# Patient Record
Sex: FEMALE | Race: WHITE | NOT HISPANIC OR LATINO | Employment: OTHER | URBAN - METROPOLITAN AREA
[De-identification: names, ages, dates, MRNs, and addresses within clinical notes are randomized per-mention and may not be internally consistent; named-entity substitution may affect disease eponyms.]

---

## 2023-10-02 ENCOUNTER — LAB REQUISITION (OUTPATIENT)
Dept: LAB | Facility: LAB | Age: 88
End: 2023-10-02
Payer: MEDICARE

## 2023-10-02 ENCOUNTER — LAB (OUTPATIENT)
Dept: LAB | Facility: LAB | Age: 88
End: 2023-10-02
Payer: MEDICARE

## 2023-10-02 DIAGNOSIS — Z01.89 ENCOUNTER FOR OTHER SPECIFIED SPECIAL EXAMINATIONS: ICD-10-CM

## 2023-10-02 LAB
ALBUMIN SERPL-MCNC: 3.8 G/DL (ref 3.5–5)
ALP BLD-CCNC: 75 U/L (ref 35–125)
ALT SERPL-CCNC: 10 U/L (ref 5–40)
ANION GAP SERPL CALC-SCNC: 10 MMOL/L
AST SERPL-CCNC: 16 U/L (ref 5–40)
BASOPHILS # BLD AUTO: 0.03 X10*3/UL (ref 0–0.1)
BASOPHILS NFR BLD AUTO: 0.3 %
BILIRUB DIRECT SERPL-MCNC: <0.2 MG/DL (ref 0–0.2)
BILIRUB SERPL-MCNC: 0.3 MG/DL (ref 0.1–1.2)
BUN SERPL-MCNC: 19 MG/DL (ref 8–25)
CALCIUM SERPL-MCNC: 9.7 MG/DL (ref 8.5–10.4)
CHLORIDE SERPL-SCNC: 102 MMOL/L (ref 97–107)
CO2 SERPL-SCNC: 26 MMOL/L (ref 24–31)
CREAT SERPL-MCNC: 0.9 MG/DL (ref 0.4–1.6)
EOSINOPHIL # BLD AUTO: 0.23 X10*3/UL (ref 0–0.4)
EOSINOPHIL NFR BLD AUTO: 2.5 %
ERYTHROCYTE [DISTWIDTH] IN BLOOD BY AUTOMATED COUNT: 13.2 % (ref 11.5–14.5)
GFR SERPL CREATININE-BSD FRML MDRD: 60 ML/MIN/1.73M*2
GLUCOSE SERPL-MCNC: 95 MG/DL (ref 65–99)
HCT VFR BLD AUTO: 36 % (ref 36–46)
HGB BLD-MCNC: 12 G/DL (ref 12–16)
IMM GRANULOCYTES # BLD AUTO: 0.02 X10*3/UL (ref 0–0.5)
IMM GRANULOCYTES NFR BLD AUTO: 0.2 % (ref 0–0.9)
LYMPHOCYTES # BLD AUTO: 2.91 X10*3/UL (ref 0.8–3)
LYMPHOCYTES NFR BLD AUTO: 32.2 %
MCH RBC QN AUTO: 30.9 PG (ref 26–34)
MCHC RBC AUTO-ENTMCNC: 33.3 G/DL (ref 32–36)
MCV RBC AUTO: 93 FL (ref 80–100)
MONOCYTES # BLD AUTO: 0.69 X10*3/UL (ref 0.05–0.8)
MONOCYTES NFR BLD AUTO: 7.6 %
NEUTROPHILS # BLD AUTO: 5.17 X10*3/UL (ref 1.6–5.5)
NEUTROPHILS NFR BLD AUTO: 57.2 %
NRBC BLD-RTO: 0 /100 WBCS (ref 0–0)
PLATELET # BLD AUTO: 278 X10*3/UL (ref 150–450)
PMV BLD AUTO: 9.9 FL (ref 7.5–11.5)
POTASSIUM SERPL-SCNC: 4.5 MMOL/L (ref 3.4–5.1)
PROT SERPL-MCNC: 6.2 G/DL (ref 5.9–7.9)
RBC # BLD AUTO: 3.88 X10*6/UL (ref 4–5.2)
SODIUM SERPL-SCNC: 138 MMOL/L (ref 133–145)
WBC # BLD AUTO: 9.1 X10*3/UL (ref 4.4–11.3)

## 2023-10-02 PROCEDURE — 36415 COLL VENOUS BLD VENIPUNCTURE: CPT

## 2023-10-02 PROCEDURE — 82248 BILIRUBIN DIRECT: CPT

## 2023-10-02 PROCEDURE — 85025 COMPLETE CBC W/AUTO DIFF WBC: CPT

## 2023-10-02 PROCEDURE — 80053 COMPREHEN METABOLIC PANEL: CPT

## 2023-10-11 ENCOUNTER — LAB (OUTPATIENT)
Dept: LAB | Facility: LAB | Age: 88
End: 2023-10-11
Payer: MEDICARE

## 2023-10-11 ENCOUNTER — OFFICE VISIT (OUTPATIENT)
Dept: PRIMARY CARE | Facility: CLINIC | Age: 88
End: 2023-10-11
Payer: MEDICARE

## 2023-10-11 ENCOUNTER — TELEPHONE (OUTPATIENT)
Dept: PRIMARY CARE | Facility: CLINIC | Age: 88
End: 2023-10-11

## 2023-10-11 VITALS
SYSTOLIC BLOOD PRESSURE: 144 MMHG | HEART RATE: 81 BPM | WEIGHT: 131 LBS | DIASTOLIC BLOOD PRESSURE: 88 MMHG | TEMPERATURE: 95.9 F | OXYGEN SATURATION: 96 %

## 2023-10-11 DIAGNOSIS — R30.0 DYSURIA: ICD-10-CM

## 2023-10-11 DIAGNOSIS — F41.9 ANXIETY: ICD-10-CM

## 2023-10-11 DIAGNOSIS — Z01.89 ENCOUNTER FOR ROUTINE LABORATORY TESTING: ICD-10-CM

## 2023-10-11 DIAGNOSIS — I10 ESSENTIAL HYPERTENSION: Primary | ICD-10-CM

## 2023-10-11 DIAGNOSIS — E55.9 VITAMIN D DEFICIENCY: ICD-10-CM

## 2023-10-11 DIAGNOSIS — N18.2 STAGE 2 CHRONIC KIDNEY DISEASE: ICD-10-CM

## 2023-10-11 DIAGNOSIS — D64.9 NORMOCYTIC ANEMIA: ICD-10-CM

## 2023-10-11 DIAGNOSIS — E78.2 MIXED HYPERLIPIDEMIA: ICD-10-CM

## 2023-10-11 LAB
POC APPEARANCE, URINE: CLEAR
POC BILIRUBIN, URINE: NEGATIVE
POC BLOOD, URINE: ABNORMAL
POC COLOR, URINE: YELLOW
POC GLUCOSE, URINE: NEGATIVE MG/DL
POC KETONES, URINE: NEGATIVE MG/DL
POC LEUKOCYTES, URINE: ABNORMAL
POC NITRITE,URINE: NEGATIVE
POC PH, URINE: 6 PH
POC PROTEIN, URINE: ABNORMAL MG/DL
POC SPECIFIC GRAVITY, URINE: 1.01
POC UROBILINOGEN, URINE: 0.2 EU/DL

## 2023-10-11 PROCEDURE — 1036F TOBACCO NON-USER: CPT | Performed by: STUDENT IN AN ORGANIZED HEALTH CARE EDUCATION/TRAINING PROGRAM

## 2023-10-11 PROCEDURE — 1160F RVW MEDS BY RX/DR IN RCRD: CPT | Performed by: STUDENT IN AN ORGANIZED HEALTH CARE EDUCATION/TRAINING PROGRAM

## 2023-10-11 PROCEDURE — 3079F DIAST BP 80-89 MM HG: CPT | Performed by: STUDENT IN AN ORGANIZED HEALTH CARE EDUCATION/TRAINING PROGRAM

## 2023-10-11 PROCEDURE — 99213 OFFICE O/P EST LOW 20 MIN: CPT | Performed by: STUDENT IN AN ORGANIZED HEALTH CARE EDUCATION/TRAINING PROGRAM

## 2023-10-11 PROCEDURE — 3077F SYST BP >= 140 MM HG: CPT | Performed by: STUDENT IN AN ORGANIZED HEALTH CARE EDUCATION/TRAINING PROGRAM

## 2023-10-11 PROCEDURE — 1126F AMNT PAIN NOTED NONE PRSNT: CPT | Performed by: STUDENT IN AN ORGANIZED HEALTH CARE EDUCATION/TRAINING PROGRAM

## 2023-10-11 PROCEDURE — 1159F MED LIST DOCD IN RCRD: CPT | Performed by: STUDENT IN AN ORGANIZED HEALTH CARE EDUCATION/TRAINING PROGRAM

## 2023-10-11 RX ORDER — FLUTICASONE PROPIONATE 50 MCG
1 SPRAY, SUSPENSION (ML) NASAL DAILY
COMMUNITY
Start: 2023-02-24 | End: 2024-04-15 | Stop reason: ALTCHOICE

## 2023-10-11 RX ORDER — DEXTROMETHORPHAN HYDROBROMIDE, GUAIFENESIN 5; 100 MG/5ML; MG/5ML
650 LIQUID ORAL EVERY 8 HOURS PRN
COMMUNITY

## 2023-10-11 RX ORDER — LISINOPRIL 20 MG/1
20 TABLET ORAL 2 TIMES DAILY
COMMUNITY
Start: 2023-08-20 | End: 2023-10-11 | Stop reason: SDUPTHER

## 2023-10-11 RX ORDER — CALCIUM CARBONATE/VITAMIN D3 500-10/5ML
400 LIQUID (ML) ORAL DAILY
COMMUNITY
End: 2024-01-22 | Stop reason: ALTCHOICE

## 2023-10-11 RX ORDER — POTASSIUM &MAGNESIUM ASPARTATE 250-250 MG
500 CAPSULE ORAL DAILY
COMMUNITY
End: 2024-01-22 | Stop reason: ALTCHOICE

## 2023-10-11 RX ORDER — LORAZEPAM 1 MG/1
1 TABLET ORAL 2 TIMES DAILY PRN
COMMUNITY
Start: 2023-07-21 | End: 2023-10-11 | Stop reason: SDUPTHER

## 2023-10-11 RX ORDER — ASPIRIN 325 MG
1200 TABLET, DELAYED RELEASE (ENTERIC COATED) ORAL DAILY
COMMUNITY
End: 2024-06-05 | Stop reason: DRUGHIGH

## 2023-10-11 RX ORDER — ATORVASTATIN CALCIUM 20 MG/1
20 TABLET, FILM COATED ORAL DAILY
Start: 2023-10-11 | End: 2024-06-05 | Stop reason: ALTCHOICE

## 2023-10-11 RX ORDER — CALCIUM CARBONATE 300MG(750)
10 TABLET,CHEWABLE ORAL DAILY
COMMUNITY
End: 2024-01-22 | Stop reason: ALTCHOICE

## 2023-10-11 RX ORDER — FUROSEMIDE 40 MG/1
40 TABLET ORAL 2 TIMES DAILY
COMMUNITY
Start: 2023-08-23 | End: 2024-06-05 | Stop reason: ALTCHOICE

## 2023-10-11 RX ORDER — FLUCONAZOLE 150 MG/1
150 TABLET ORAL ONCE
Qty: 3 TABLET | Refills: 0 | Status: SHIPPED | OUTPATIENT
Start: 2023-10-11 | End: 2023-10-11

## 2023-10-11 RX ORDER — LORAZEPAM 1 MG/1
1 TABLET ORAL 2 TIMES DAILY PRN
Start: 2023-10-11 | End: 2023-12-01 | Stop reason: SDUPTHER

## 2023-10-11 RX ORDER — ATORVASTATIN CALCIUM 20 MG/1
20 TABLET, FILM COATED ORAL DAILY
COMMUNITY
Start: 2023-01-30 | End: 2023-10-11 | Stop reason: SDUPTHER

## 2023-10-11 RX ORDER — UBIDECARENONE 75 MG
500 CAPSULE ORAL DAILY
COMMUNITY
End: 2024-01-22 | Stop reason: ALTCHOICE

## 2023-10-11 RX ORDER — CEPHALEXIN 500 MG/1
500 CAPSULE ORAL 4 TIMES DAILY
Qty: 20 CAPSULE | Refills: 0 | Status: SHIPPED | OUTPATIENT
Start: 2023-10-11 | End: 2023-10-16

## 2023-10-11 RX ORDER — LISINOPRIL 20 MG/1
20 TABLET ORAL 2 TIMES DAILY
Start: 2023-10-11 | End: 2023-12-01 | Stop reason: SDUPTHER

## 2023-10-11 RX ORDER — ASPIRIN 81 MG/1
81 TABLET ORAL DAILY
COMMUNITY
End: 2024-01-22 | Stop reason: ALTCHOICE

## 2023-10-11 RX ORDER — ACETAMINOPHEN 500 MG
50 TABLET ORAL DAILY
Start: 2023-10-11 | End: 2024-06-05 | Stop reason: ALTCHOICE

## 2023-10-11 RX ORDER — MECLIZINE HYDROCHLORIDE 25 MG/1
25 TABLET ORAL EVERY 8 HOURS PRN
COMMUNITY
Start: 2023-07-21 | End: 2024-02-19 | Stop reason: ALTCHOICE

## 2023-10-11 RX ORDER — ACETAMINOPHEN 500 MG
50 TABLET ORAL DAILY
COMMUNITY
End: 2023-10-11 | Stop reason: SDUPTHER

## 2023-10-11 RX ORDER — FERROUS SULFATE 325(65) MG
65 TABLET ORAL
COMMUNITY
End: 2023-10-11 | Stop reason: SDUPTHER

## 2023-10-11 RX ORDER — FERROUS SULFATE 325(65) MG
65 TABLET ORAL
Start: 2023-10-11 | End: 2024-06-05 | Stop reason: ALTCHOICE

## 2023-10-11 RX ORDER — AMLODIPINE BESYLATE 5 MG/1
5 TABLET ORAL DAILY
COMMUNITY
Start: 2023-03-06 | End: 2023-10-11 | Stop reason: SDUPTHER

## 2023-10-11 RX ORDER — AMLODIPINE BESYLATE 5 MG/1
5 TABLET ORAL DAILY
Start: 2023-10-11 | End: 2024-06-05 | Stop reason: ALTCHOICE

## 2023-10-11 ASSESSMENT — ENCOUNTER SYMPTOMS
HEMATURIA: 1
RESPIRATORY NEGATIVE: 1
CONSTITUTIONAL NEGATIVE: 1
DYSURIA: 1
FREQUENCY: 1
OCCASIONAL FEELINGS OF UNSTEADINESS: 0
GASTROINTESTINAL NEGATIVE: 1
DEPRESSION: 0
LOSS OF SENSATION IN FEET: 0
CARDIOVASCULAR NEGATIVE: 1

## 2023-10-11 ASSESSMENT — PATIENT HEALTH QUESTIONNAIRE - PHQ9
SUM OF ALL RESPONSES TO PHQ9 QUESTIONS 1 AND 2: 0
2. FEELING DOWN, DEPRESSED OR HOPELESS: NOT AT ALL
1. LITTLE INTEREST OR PLEASURE IN DOING THINGS: NOT AT ALL

## 2023-10-11 ASSESSMENT — PAIN SCALES - GENERAL: PAINLEVEL: 0-NO PAIN

## 2023-10-11 NOTE — PROGRESS NOTES
[facility]: [facility]  PROGRESS NOTE      Claudine Jones is a 89 y.o. female that is presenting today for Follow-up.    Assessment/Plan   Diagnoses and all orders for this visit:  Essential hypertension  Comments:  Stable. No changes at this time.  Orders:  -     amLODIPine (Norvasc) 5 mg tablet; Take 1 tablet (5 mg) by mouth once daily.  -     lisinopril 20 mg tablet; Take 1 tablet (20 mg) by mouth 2 times a day.  -     Basic Metabolic Panel; Future  Mixed hyperlipidemia  -     atorvastatin (Lipitor) 20 mg tablet; Take 1 tablet (20 mg) by mouth once daily.  -     Hepatic Function Panel; Future  Stage 2 chronic kidney disease  Comments:  Age-related kidney dysfunction. Not a problem.  Normocytic anemia  Comments:  Looks great. No changes at this time.  Orders:  -     ferrous sulfate 325 (65 Fe) MG tablet; Take 1 tablet (65 mg of iron) by mouth once daily with a meal.  -     CBC and Auto Differential; Future  Vitamin D deficiency  -     cholecalciferol (Vitamin D3) 50 mcg (2,000 unit) capsule; Take 1 capsule (50 mcg) by mouth once daily.  -     Vitamin D 25-Hydroxy,Total (for eval of Vitamin D levels); Future  Anxiety  -     LORazepam (Ativan) 1 mg tablet; Take 1 tablet (1 mg) by mouth 2 times a day as needed for anxiety.  Dysuria  Comments:  Patient noting discharge with concern for yeast infection.  Will order the diflucan now. Check UA and culture before she leaves the office.  Orders:  -     POCT UA (nonautomated) manually resulted  -     fluconazole (Diflucan) 150 mg tablet; Take 1 tablet (150 mg) by mouth 1 time for 1 dose.  -     Urinalysis with Reflex Microscopic and Culture  -     cephalexin (Keflex) 500 mg capsule; Take 1 capsule (500 mg) by mouth 4 times a day for 5 days.  Encounter for routine laboratory testing  Comments:  Labwork today looked great.  Will order labwork for next appointment.  Orders:  -     Basic Metabolic Panel; Future  -     Hepatic Function Panel; Future  -      CBC and Auto Differential; Future  -     Vitamin D 25-Hydroxy,Total (for eval of Vitamin D levels); Future  Other orders  -     Follow Up In Primary Care; Future    Subjective   Difficulty Urinating   This is a new problem. The current episode started in the past 7 days. The problem occurs every urination. The problem has been gradually worsening. The quality of the pain is described as burning (itching). The pain is severe. There has been no fever. She is Not sexually active. There is No history of pyelonephritis. Associated symptoms include a discharge, frequency and hematuria. She has tried nothing for the symptoms. Her past medical history is significant for recurrent UTIs.     Review of Systems   Constitutional: Negative.    Respiratory: Negative.     Cardiovascular: Negative.    Gastrointestinal: Negative.    Genitourinary:  Positive for dysuria, frequency and hematuria.      Objective   Vitals:    10/11/23 0807   BP: 144/88   Pulse: 81   Temp: 35.5 °C (95.9 °F)   SpO2: 96%      There is no height or weight on file to calculate BMI.  Physical Exam  Constitutional:       General: She is not in acute distress.  Neck:      Vascular: No carotid bruit.   Cardiovascular:      Rate and Rhythm: Normal rate and regular rhythm.      Heart sounds: Normal heart sounds.   Pulmonary:      Effort: Pulmonary effort is normal.      Breath sounds: Normal breath sounds.   Musculoskeletal:         General: No swelling.   Neurological:      Mental Status: She is alert. Mental status is at baseline.   Psychiatric:         Mood and Affect: Mood normal.       Diagnostic Results   Lab Results   Component Value Date    GLUCOSE 95 10/02/2023    CALCIUM 9.7 10/02/2023     10/02/2023    K 4.5 10/02/2023    CO2 26 10/02/2023     10/02/2023    BUN 19 10/02/2023    CREATININE 0.90 10/02/2023     Lab Results   Component Value Date    ALT 10 10/02/2023    AST 16 10/02/2023    ALKPHOS 75 10/02/2023    BILITOT 0.3 10/02/2023  "    Lab Results   Component Value Date    WBC 9.1 10/02/2023    HGB 12.0 10/02/2023    HCT 36.0 10/02/2023    MCV 93 10/02/2023     10/02/2023     No results found for: \"CHOL\"  No results found for: \"HDL\"  No results found for: \"LDLCALC\"  No results found for: \"TRIG\"  No components found for: \"CHOLHDL\"  No results found for: \"HGBA1C\"  Other labs not included in the list above were reviewed either before or during this encounter.    History    History reviewed. No pertinent past medical history.  History reviewed. No pertinent surgical history.  No family history on file.  Social History     Socioeconomic History    Marital status: Unknown     Spouse name: Not on file    Number of children: Not on file    Years of education: Not on file    Highest education level: Not on file   Occupational History    Not on file   Tobacco Use    Smoking status: Never    Smokeless tobacco: Never   Substance and Sexual Activity    Alcohol use: Never    Drug use: Never    Sexual activity: Not on file   Other Topics Concern    Not on file   Social History Narrative    Not on file     Social Determinants of Health     Financial Resource Strain: Not on file   Food Insecurity: Not on file   Transportation Needs: Not on file   Physical Activity: Not on file   Stress: Not on file   Social Connections: Not on file   Intimate Partner Violence: Not on file   Housing Stability: Not on file     No Known Allergies  Current Outpatient Medications on File Prior to Visit   Medication Sig Dispense Refill    acetaminophen (Tylenol 8 HOUR) 650 mg ER tablet Take 1 tablet (650 mg) by mouth every 8 hours if needed for mild pain (1 - 3). Do not crush, chew, or split.      aspirin 81 mg EC tablet Take 1 tablet (81 mg) by mouth once daily.      calcium carbonate (CALCIUM 500 ORAL) Take 600 mg by mouth once daily.      cranberry 500 mg capsule Take 500 mg by mouth once daily.      cyanocobalamin (Vitamin B-12) 500 mcg tablet Take 1 tablet (500 mcg) by " mouth once daily.      fluticasone (Flonase) 50 mcg/actuation nasal spray Administer 1 spray into each nostril once daily. shake liquid      furosemide (Lasix) 40 mg tablet Take 1 tablet (40 mg) by mouth 2 times a day.      magnesium oxide 400 mg magnesium capsule Take 1 capsule (400 mg) by mouth once daily.      meclizine (Antivert) 25 mg tablet Take 1 tablet (25 mg) by mouth every 8 hours if needed.      melatonin 10 mg tablet,disintegrating Take 10 mg by mouth once daily.      omega-3 (Fish Oil) 60- mg capsule Take 1,200 mg by mouth once daily.      [DISCONTINUED] amLODIPine (Norvasc) 5 mg tablet Take 1 tablet (5 mg) by mouth once daily.      [DISCONTINUED] atorvastatin (Lipitor) 20 mg tablet Take 1 tablet (20 mg) by mouth once daily.      [DISCONTINUED] cholecalciferol (Vitamin D3) 50 mcg (2,000 unit) capsule Take 1 capsule (50 mcg) by mouth once daily. 2000 mcg      [DISCONTINUED] ferrous sulfate 325 (65 Fe) MG tablet Take 1 tablet (65 mg of iron) by mouth once daily with a meal.      [DISCONTINUED] lisinopril 20 mg tablet Take 1 tablet (20 mg) by mouth 2 times a day.      [DISCONTINUED] LORazepam (Ativan) 1 mg tablet Take 1 tablet (1 mg) by mouth 2 times a day as needed.       No current facility-administered medications on file prior to visit.       There is no immunization history on file for this patient.  Patient's medical history was reviewed and updated either before or during this encounter.    Cash Palencia MD

## 2023-10-11 NOTE — TELEPHONE ENCOUNTER
Millie Salvador called office to clarify Rx for yeast infection. States that she is unsure whether patient is supposed to take now, or after she finishes the atb. She states that pharmacist advised her to call for clarification. Please advise.

## 2023-10-20 ENCOUNTER — TELEPHONE (OUTPATIENT)
Dept: PRIMARY CARE | Facility: CLINIC | Age: 88
End: 2023-10-20
Payer: MEDICARE

## 2023-10-20 DIAGNOSIS — R30.0 DYSURIA: Primary | ICD-10-CM

## 2023-10-20 NOTE — TELEPHONE ENCOUNTER
Patient's friend called office one behalf of patient. Per Millie, patient c/o unresolved urinary burning. States that she felt better initially but burning has returned over last two days. Requesting more atb. Please advise.

## 2023-10-21 RX ORDER — CIPROFLOXACIN 500 MG/1
500 TABLET ORAL 2 TIMES DAILY
Qty: 20 TABLET | Refills: 0 | Status: SHIPPED | OUTPATIENT
Start: 2023-10-21 | End: 2023-10-31

## 2023-12-01 ENCOUNTER — TELEPHONE (OUTPATIENT)
Dept: PRIMARY CARE | Facility: CLINIC | Age: 88
End: 2023-12-01
Payer: MEDICARE

## 2023-12-01 DIAGNOSIS — F41.9 ANXIETY: ICD-10-CM

## 2023-12-01 DIAGNOSIS — I10 ESSENTIAL HYPERTENSION: ICD-10-CM

## 2023-12-01 NOTE — TELEPHONE ENCOUNTER
Patient called to request refills to [facility]:  Lisinopril and lorazepam

## 2023-12-04 DIAGNOSIS — I10 ESSENTIAL HYPERTENSION: ICD-10-CM

## 2023-12-04 RX ORDER — LORAZEPAM 1 MG/1
1 TABLET ORAL 2 TIMES DAILY PRN
Qty: 30 TABLET | Refills: 3 | Status: SHIPPED | OUTPATIENT
Start: 2023-12-04 | End: 2024-06-05 | Stop reason: DRUGHIGH

## 2023-12-04 RX ORDER — LISINOPRIL 20 MG/1
20 TABLET ORAL 2 TIMES DAILY
Qty: 90 TABLET | Refills: 3 | Status: SHIPPED | OUTPATIENT
Start: 2023-12-04 | End: 2023-12-05

## 2023-12-05 RX ORDER — LISINOPRIL 20 MG/1
20 TABLET ORAL 2 TIMES DAILY
Qty: 180 TABLET | Refills: 3 | Status: SHIPPED | OUTPATIENT
Start: 2023-12-05 | End: 2024-06-05 | Stop reason: DRUGHIGH

## 2024-01-17 ENCOUNTER — APPOINTMENT (OUTPATIENT)
Dept: RADIOLOGY | Facility: HOSPITAL | Age: 89
End: 2024-01-17
Payer: MEDICARE

## 2024-01-17 ENCOUNTER — HOSPITAL ENCOUNTER (EMERGENCY)
Facility: HOSPITAL | Age: 89
Discharge: HOME | End: 2024-01-17
Attending: EMERGENCY MEDICINE
Payer: MEDICARE

## 2024-01-17 VITALS
HEIGHT: 62 IN | WEIGHT: 136 LBS | RESPIRATION RATE: 14 BRPM | SYSTOLIC BLOOD PRESSURE: 178 MMHG | OXYGEN SATURATION: 100 % | DIASTOLIC BLOOD PRESSURE: 76 MMHG | HEART RATE: 85 BPM | TEMPERATURE: 97.1 F | BODY MASS INDEX: 25.03 KG/M2

## 2024-01-17 DIAGNOSIS — N39.0 ACUTE UTI: Primary | ICD-10-CM

## 2024-01-17 DIAGNOSIS — S62.91XA CLOSED FRACTURE OF RIGHT HAND, INITIAL ENCOUNTER: ICD-10-CM

## 2024-01-17 DIAGNOSIS — T14.8XXA HEMATOMA: ICD-10-CM

## 2024-01-17 DIAGNOSIS — S09.90XA CLOSED HEAD INJURY, INITIAL ENCOUNTER: ICD-10-CM
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
## 2024-01-17 DIAGNOSIS — S62.501A CLOSED NONDISPLACED FRACTURE OF PHALANX OF RIGHT THUMB, UNSPECIFIED PHALANX, INITIAL ENCOUNTER: ICD-10-CM

## 2024-01-17 LAB
ALBUMIN SERPL-MCNC: 3.6 G/DL (ref 3.5–5)
ALP BLD-CCNC: 69 U/L (ref 35–125)
ALT SERPL-CCNC: 9 U/L (ref 5–40)
ANION GAP SERPL CALC-SCNC: 9 MMOL/L
APPEARANCE UR: CLEAR
AST SERPL-CCNC: 19 U/L (ref 5–40)
BACTERIA #/AREA URNS AUTO: ABNORMAL /HPF
BASOPHILS # BLD AUTO: 0.03 X10*3/UL (ref 0–0.1)
BASOPHILS NFR BLD AUTO: 0.4 %
BILIRUB SERPL-MCNC: <0.2 MG/DL (ref 0.1–1.2)
BILIRUB UR STRIP.AUTO-MCNC: NEGATIVE MG/DL
BUN SERPL-MCNC: 22 MG/DL (ref 8–25)
CALCIUM SERPL-MCNC: 8.7 MG/DL (ref 8.5–10.4)
CHLORIDE SERPL-SCNC: 104 MMOL/L (ref 97–107)
CO2 SERPL-SCNC: 26 MMOL/L (ref 24–31)
COLOR UR: ABNORMAL
CREAT SERPL-MCNC: 0.8 MG/DL (ref 0.4–1.6)
EGFRCR SERPLBLD CKD-EPI 2021: 69 ML/MIN/1.73M*2
EOSINOPHIL # BLD AUTO: 0.24 X10*3/UL (ref 0–0.4)
EOSINOPHIL NFR BLD AUTO: 2.8 %
ERYTHROCYTE [DISTWIDTH] IN BLOOD BY AUTOMATED COUNT: 12.7 % (ref 11.5–14.5)
GLUCOSE SERPL-MCNC: 131 MG/DL (ref 65–99)
GLUCOSE UR STRIP.AUTO-MCNC: NORMAL MG/DL
HCT VFR BLD AUTO: 34.5 % (ref 36–46)
HGB BLD-MCNC: 11.8 G/DL (ref 12–16)
IMM GRANULOCYTES # BLD AUTO: 0.01 X10*3/UL (ref 0–0.5)
IMM GRANULOCYTES NFR BLD AUTO: 0.1 % (ref 0–0.9)
KETONES UR STRIP.AUTO-MCNC: NEGATIVE MG/DL
LEUKOCYTE ESTERASE UR QL STRIP.AUTO: ABNORMAL
LYMPHOCYTES # BLD AUTO: 2.83 X10*3/UL (ref 0.8–3)
LYMPHOCYTES NFR BLD AUTO: 33.5 %
MCH RBC QN AUTO: 31 PG (ref 26–34)
MCHC RBC AUTO-ENTMCNC: 34.2 G/DL (ref 32–36)
MCV RBC AUTO: 91 FL (ref 80–100)
MONOCYTES # BLD AUTO: 0.62 X10*3/UL (ref 0.05–0.8)
MONOCYTES NFR BLD AUTO: 7.3 %
MUCOUS THREADS #/AREA URNS AUTO: ABNORMAL /LPF
NEUTROPHILS # BLD AUTO: 4.73 X10*3/UL (ref 1.6–5.5)
NEUTROPHILS NFR BLD AUTO: 55.9 %
NITRITE UR QL STRIP.AUTO: NEGATIVE
NRBC BLD-RTO: 0 /100 WBCS (ref 0–0)
PH UR STRIP.AUTO: 6.5 [PH]
PLATELET # BLD AUTO: 233 X10*3/UL (ref 150–450)
POTASSIUM SERPL-SCNC: 4.1 MMOL/L (ref 3.4–5.1)
PROT SERPL-MCNC: 6 G/DL (ref 5.9–7.9)
PROT UR STRIP.AUTO-MCNC: NEGATIVE MG/DL
RBC # BLD AUTO: 3.81 X10*6/UL (ref 4–5.2)
RBC # UR STRIP.AUTO: ABNORMAL /UL
RBC #/AREA URNS AUTO: ABNORMAL /HPF
SARS-COV-2 RNA RESP QL NAA+PROBE: NOT DETECTED
SODIUM SERPL-SCNC: 139 MMOL/L (ref 133–145)
SP GR UR STRIP.AUTO: 1.01
UROBILINOGEN UR STRIP.AUTO-MCNC: NORMAL MG/DL
WBC # BLD AUTO: 8.5 X10*3/UL (ref 4.4–11.3)
WBC #/AREA URNS AUTO: ABNORMAL /HPF

## 2024-01-17 PROCEDURE — 81001 URINALYSIS AUTO W/SCOPE: CPT | Performed by: PHYSICIAN ASSISTANT

## 2024-01-17 PROCEDURE — 2500000004 HC RX 250 GENERAL PHARMACY W/ HCPCS (ALT 636 FOR OP/ED): Performed by: PHYSICIAN ASSISTANT

## 2024-01-17 PROCEDURE — 85025 COMPLETE CBC W/AUTO DIFF WBC: CPT | Performed by: PHYSICIAN ASSISTANT

## 2024-01-17 PROCEDURE — 36415 COLL VENOUS BLD VENIPUNCTURE: CPT | Performed by: PHYSICIAN ASSISTANT

## 2024-01-17 PROCEDURE — 80053 COMPREHEN METABOLIC PANEL: CPT | Performed by: PHYSICIAN ASSISTANT

## 2024-01-17 PROCEDURE — 96365 THER/PROPH/DIAG IV INF INIT: CPT

## 2024-01-17 PROCEDURE — 87635 SARS-COV-2 COVID-19 AMP PRB: CPT | Performed by: PHYSICIAN ASSISTANT

## 2024-01-17 PROCEDURE — G0390 TRAUMA RESPONS W/HOSP CRITI: HCPCS

## 2024-01-17 PROCEDURE — 2500000001 HC RX 250 WO HCPCS SELF ADMINISTERED DRUGS (ALT 637 FOR MEDICARE OP): Performed by: PHYSICIAN ASSISTANT

## 2024-01-17 PROCEDURE — 73130 X-RAY EXAM OF HAND: CPT | Mod: RT

## 2024-01-17 PROCEDURE — 99285 EMERGENCY DEPT VISIT HI MDM: CPT | Performed by: EMERGENCY MEDICINE

## 2024-01-17 PROCEDURE — 70450 CT HEAD/BRAIN W/O DYE: CPT

## 2024-01-17 PROCEDURE — 71045 X-RAY EXAM CHEST 1 VIEW: CPT

## 2024-01-17 PROCEDURE — 29125 APPL SHORT ARM SPLINT STATIC: CPT | Mod: RT

## 2024-01-17 PROCEDURE — 87086 URINE CULTURE/COLONY COUNT: CPT | Mod: WESLAB | Performed by: PHYSICIAN ASSISTANT

## 2024-01-17 PROCEDURE — 90715 TDAP VACCINE 7 YRS/> IM: CPT | Performed by: PHYSICIAN ASSISTANT

## 2024-01-17 PROCEDURE — 90471 IMMUNIZATION ADMIN: CPT | Performed by: PHYSICIAN ASSISTANT

## 2024-01-17 PROCEDURE — 72125 CT NECK SPINE W/O DYE: CPT

## 2024-01-17 PROCEDURE — 72170 X-RAY EXAM OF PELVIS: CPT

## 2024-01-17 RX ORDER — HYDROCODONE BITARTRATE AND ACETAMINOPHEN 5; 325 MG/1; MG/1
1 TABLET ORAL EVERY 6 HOURS PRN
Qty: 12 TABLET | Refills: 0 | Status: SHIPPED | OUTPATIENT
Start: 2024-01-17 | End: 2024-01-20

## 2024-01-17 RX ORDER — CEPHALEXIN 500 MG/1
500 CAPSULE ORAL 2 TIMES DAILY
Qty: 14 CAPSULE | Refills: 0 | Status: SHIPPED | OUTPATIENT
Start: 2024-01-17 | End: 2024-01-24

## 2024-01-17 RX ORDER — CEFTRIAXONE 1 G/50ML
1 INJECTION, SOLUTION INTRAVENOUS ONCE
Status: COMPLETED | OUTPATIENT
Start: 2024-01-17 | End: 2024-01-17

## 2024-01-17 RX ORDER — HYDROCODONE BITARTRATE AND ACETAMINOPHEN 5; 325 MG/1; MG/1
1 TABLET ORAL ONCE
Status: COMPLETED | OUTPATIENT
Start: 2024-01-17 | End: 2024-01-17

## 2024-01-17 RX ADMIN — HYDROCODONE BITARTRATE AND ACETAMINOPHEN 1 TABLET: 5; 325 TABLET ORAL at 20:15

## 2024-01-17 RX ADMIN — CEFTRIAXONE SODIUM 1 G: 1 INJECTION, SOLUTION INTRAVENOUS at 19:59

## 2024-01-17 RX ADMIN — TETANUS TOXOID, REDUCED DIPHTHERIA TOXOID AND ACELLULAR PERTUSSIS VACCINE, ADSORBED 0.5 ML: 5; 2.5; 8; 8; 2.5 SUSPENSION INTRAMUSCULAR at 17:01

## 2024-01-17 ASSESSMENT — PAIN - FUNCTIONAL ASSESSMENT: PAIN_FUNCTIONAL_ASSESSMENT: 0-10

## 2024-01-17 ASSESSMENT — LIFESTYLE VARIABLES
EVER FELT BAD OR GUILTY ABOUT YOUR DRINKING: NO
HAVE PEOPLE ANNOYED YOU BY CRITICIZING YOUR DRINKING: NO
EVER HAD A DRINK FIRST THING IN THE MORNING TO STEADY YOUR NERVES TO GET RID OF A HANGOVER: NO
HAVE YOU EVER FELT YOU SHOULD CUT DOWN ON YOUR DRINKING: NO
REASON UNABLE TO ASSESS: NO

## 2024-01-17 ASSESSMENT — PAIN DESCRIPTION - DESCRIPTORS: DESCRIPTORS: ACHING

## 2024-01-17 ASSESSMENT — COLUMBIA-SUICIDE SEVERITY RATING SCALE - C-SSRS
1. IN THE PAST MONTH, HAVE YOU WISHED YOU WERE DEAD OR WISHED YOU COULD GO TO SLEEP AND NOT WAKE UP?: NO
6. HAVE YOU EVER DONE ANYTHING, STARTED TO DO ANYTHING, OR PREPARED TO DO ANYTHING TO END YOUR LIFE?: NO
2. HAVE YOU ACTUALLY HAD ANY THOUGHTS OF KILLING YOURSELF?: NO

## 2024-01-17 ASSESSMENT — PAIN DESCRIPTION - ONSET: ONSET: SUDDEN

## 2024-01-17 ASSESSMENT — PAIN DESCRIPTION - LOCATION: LOCATION: BUTTOCKS

## 2024-01-17 ASSESSMENT — PAIN DESCRIPTION - FREQUENCY: FREQUENCY: CONSTANT/CONTINUOUS

## 2024-01-17 ASSESSMENT — PAIN SCALES - GENERAL
PAINLEVEL_OUTOF10: 3
PAINLEVEL_OUTOF10: 8

## 2024-01-17 ASSESSMENT — PAIN DESCRIPTION - PAIN TYPE: TYPE: ACUTE PAIN

## 2024-01-17 ASSESSMENT — PAIN DESCRIPTION - PROGRESSION: CLINICAL_PROGRESSION: NOT CHANGED

## 2024-01-17 ASSESSMENT — PAIN DESCRIPTION - ORIENTATION: ORIENTATION: LEFT

## 2024-01-17 NOTE — ED PROVIDER NOTES
HPI   Chief Complaint   Patient presents with    Fall     Pt walking up the stairs and loss her balance and fell backwards landing on her bottom. Does not remember if she hit her head. Patient states she is on blood thinners.       HPI  89-year-old female here by EMS for evaluation of a fall, was going up the stairs to her apartment when her hand slipped off the railing and she fell backwards, complaining of pain in her right hand, her gluteal region and says that she did strike her head, says she is on blood thinners but does not know which 1 but also tells me that she takes them for blood pressure.  No loss of consciousness.                  [place] Coma Scale Score: 15         NIH Stroke Scale: 0          Patient History   No past medical history on file.  No past surgical history on file.  No family history on file.  Social History     Tobacco Use    Smoking status: Not on file    Smokeless tobacco: Not on file   Substance Use Topics    Alcohol use: Not on file    Drug use: Not on file       Physical Exam   ED Triage Vitals   Temp Pulse Resp BP   -- -- -- --      SpO2 Temp src Heart Rate Source Patient Position   -- -- -- --      BP Location FiO2 (%)     -- --       Physical Exam  PHYSICAL EXAMINATION    GENERAL APPEARANCE: Awake and alert.     VITAL SIGNS: As per the nurses' triage record.     HEENT: Normocephalic, no evidence of step-off Ken sign or hemotympanums. Extraocular muscles are intact. Pupils equal round and reactive to light. Conjunctiva are pink. Negative scleral icterus. Mucous membranes are moist. Tongue in the midline. Pharynx was without erythema or exudates, uvula midline    NECK: Soft Nontender and supple, full gross ROM, no meningeal signs.    CHEST: Nontender to palpation. Clear to auscultation bilaterally. No rales, rhonchi, or wheezing.     HEART: S1, S2. Regular rate and rhythm. No murmurs, gallops or rubs.  Strong and equal pulses in the extremities.     ABDOMEN: Soft, nontender,  nondistended, positive bowel sounds, no palpable masses.    MUSCULOSKELETAL: Right hand has swelling to the thumb and thenar eminence, no break in the skin.  Some ecchymosis around this region appreciated.  Gluteal hematoma on the left.     NEUROLOGICAL: Awake, alert and oriented x 3. Power intact in the upper and lower extremities. Sensation is intact to light touch in the upper and lower extremities.     IMMUNOLOGICAL: No lymphatic streaking noted     DERM: No petechiae, rashes, or ecchymoses.  ED Course & MDM   ED Course as of 01/17/24 2042 Wed Jan 17, 2024 1645 Just prior to the patient's arrival I spoke to trauma on-call Dr. Choe discussed the case with the known details. [AP]   1935 Discussed findings with trauma physician.  He is on board with discharge and follow-up [AP]   1952 I discussed all findings with the patient.  The patient is adamant about going home, patient has a friend who is identifying as medical power of  who states that she is very concerned about the patient going home due to her unsteadiness.  The patient is fully awake alert oriented and I believe has the medical capacity to make her own medical decisions.  They have indicated they would like to talk about this and consider the options of staying for placement versus going home. [AP]   2008 Patient ambulated very well to the restroom and back with no assistance. [AP]   2035 Discussed all findings, patient wants to go home, I will send the patient home with antibiotics for urinary tract infection and pain medicines for the broken hand.  The patient has been splinted and an extra long thumb spica splint involving the entirety of the thumb to immobilize the thumb and wrist, the patient had a lot of difficulty with dorsiflexion of the wrist secondary to some arthritis, her wrist is flexed palmarly slightly, as it is far more comfortable for her.  Splint instructions discussed [AP]      ED Course User Index  [AP] Demond CARLOS  SHI Johnson         Diagnoses as of 01/17/24 2042   Acute UTI   Closed nondisplaced fracture of phalanx of right thumb, unspecified phalanx, initial encounter   Closed fracture of right hand, initial encounter   Fall, initial encounter   Closed head injury, initial encounter       Medical Decision Making  Patient has been seen in conjunction with attending physician Dr. Prasad    Parts of this chart have been completed using voice recognition software. Please excuse any errors of transcription.  My thought process and reason for plan has been formulated from the time that I saw the patient until the time of disposition and is not specific to one specific moment during their visit and furthermore my MDM encompasses this entire chart and not only this text box.      HPI: Detailed above.    Exam: A medically appropriate exam performed, outlined above, given the known history and presentation.    History obtained from: pt and ems    Social Determinants of Health considered during this visit: Lives at home    Medications given during visit:  Medications   diphth,pertus(acell),tetanus (BoostRIX) 2.5-8-5 Lf-mcg-Lf/0.5mL vaccine 0.5 mL (0.5 mL intramuscular Given 1/17/24 1701)   cefTRIAXone (Rocephin) IVPB 1 g (0 g intravenous Stopped 1/17/24 2015)   HYDROcodone-acetaminophen (Norco) 5-325 mg per tablet 1 tablet (1 tablet oral Given 1/17/24 2015)        Diagnostic/tests  Labs Reviewed   CBC WITH AUTO DIFFERENTIAL - Abnormal       Result Value    WBC 8.5      nRBC 0.0      RBC 3.81 (*)     Hemoglobin 11.8 (*)     Hematocrit 34.5 (*)     MCV 91      MCH 31.0      MCHC 34.2      RDW 12.7      Platelets 233      Neutrophils % 55.9      Immature Granulocytes %, Automated 0.1      Lymphocytes % 33.5      Monocytes % 7.3      Eosinophils % 2.8      Basophils % 0.4      Neutrophils Absolute 4.73      Immature Granulocytes Absolute, Automated 0.01      Lymphocytes Absolute 2.83      Monocytes Absolute 0.62      Eosinophils  Absolute 0.24      Basophils Absolute 0.03     COMPREHENSIVE METABOLIC PANEL - Abnormal    Glucose 131 (*)     Sodium 139      Potassium 4.1      Chloride 104      Bicarbonate 26      Urea Nitrogen 22      Creatinine 0.80      eGFR 69      Calcium 8.7      Albumin 3.6      Alkaline Phosphatase 69      Total Protein 6.0      AST 19      Bilirubin, Total <0.2      ALT 9      Anion Gap 9     URINALYSIS WITH REFLEX CULTURE AND MICROSCOPIC - Abnormal    Color, Urine Light-Yellow      Appearance, Urine Clear      Specific Gravity, Urine 1.010      pH, Urine 6.5      Protein, Urine NEGATIVE      Glucose, Urine Normal      Blood, Urine 0.03 (TRACE) (*)     Ketones, Urine NEGATIVE      Bilirubin, Urine NEGATIVE      Urobilinogen, Urine Normal      Nitrite, Urine NEGATIVE      Leukocyte Esterase, Urine 75 Sultana/µL (*)    MICROSCOPIC ONLY, URINE - Abnormal    WBC, Urine 6-10 (*)     RBC, Urine 3-5      Bacteria, Urine 1+ (*)     Mucus, Urine FEW     SARS-COV-2 PCR, SYMPTOMATIC - Normal    Coronavirus 2019, PCR Not Detected      Narrative:     This assay has received FDA Emergency Use Authorization (EUA) and is only authorized for the duration of time that circumstances exist to justify the authorization of the emergency use of in vitro diagnostic tests for the detection of SARS-CoV-2 virus and/or diagnosis of COVID-19 infection under section 564(b)(1) of the Act, 21 U.S.C. 360bbb-3(b)(1). This assay is an in vitro diagnostic nucleic acid amplification test for the qualitative detection of SARS-CoV-2 from nasopharyngeal specimens and has been validated for use at [facility]. Negative results do not preclude COVID-19 infections and should not be used as the sole basis for diagnosis, treatment, or other management decisions.     URINE CULTURE   URINALYSIS WITH REFLEX CULTURE AND MICROSCOPIC    Narrative:     The following orders were created for panel order Urinalysis with Reflex Culture and  Microscopic.  Procedure                               Abnormality         Status                     ---------                               -----------         ------                     Urinalysis with Reflex C...[[id number]]  Abnormal            Final result               Extra Urine Gray Tube[[id number]]                            In process                   Please view results for these tests on the individual orders.   EXTRA URINE GRAY TUBE      CT head wo IV contrast   Final Result   No acute intracranial abnormality.        MACRO:   None.        Signed by: Chance Jiménez 1/17/2024 5:46 PM   Dictation workstation:   [id number]      CT cervical spine wo IV contrast   Final Result   1. No acute fracture or spondylolisthesis.   2. Degenerative disc disease and spondylosis.             Signed by: Chance Jiménez 1/17/2024 5:47 PM   Dictation workstation:   [id number]      XR chest 1 view   Final Result   No acute cardiopulmonary disease.        Signed by: Sameer Wheeler 1/17/2024 5:18 PM   Dictation workstation:   [id number]      XR hand right 3+ views   Final Result   Acute fracture through the proximal diaphysis of the distal phalanx   of the thumb. There is also a minimally displaced/angulated fracture   through the proximal diaphysis of the 1st metacarpal.        Signed by: Sameer Wheeler 1/17/2024 5:25 PM   Dictation workstation:   [id number]      XR pelvis 1-2 views   Final Result   No evidence for acute fracture or dislocation.             Signed by: Sameer Wheeler 1/17/2024 5:12 PM   Dictation workstation:   [id number]           Considerations/further MDM:  Patient feels comfortable going home, not interested at all in staying in the hospital despite the offer, I considered head injury, intracranial pathology, intracranial hemorrhage, spinal cord involvement or spinal injury such as cervical fractures.  Also considered hand injuries and hand fractures and other orthopedic injuries.   Considered intra-abdominal intrathoracic pathology.  Patient is up and ambulatory has isolated pain at this time to the right hand, has been splinted in accordance to the injury.  The patient has been provided follow-up to hand/orthopedics, says that she follows closely with her family doctor who she does not usually have any trouble getting in for expedited follow-up.  Discussed splint care and management as well as return precautions and follow-up instructions.  I did offer the patient hospitalization however she got up walked around the department went to the bathroom on her own and stated that she does not feel that she needs to be in the hospital and also now the family who identifies as power of  is agreeable to this plan as well.  They have discussed an understanding of return precautions and follow-up instructions.  I will send them home with prescriptions for Keflex for urinary tract infection as well as Norco for the hand fracture      Procedure  Procedures     Demond Johnson PA-C  01/17/24 1707

## 2024-01-17 NOTE — PROGRESS NOTES
Attestation/Supervisory note for ROVERTO Johnson      The patient is a 89-year-old female presenting to the emergency department by EMS from home after she reportedly fell down 4 steps.  She states that she just missed a step and it caused her to fall down 4 steps.  She states she injured her left arm, right thumb head and neck.  She states that most of the pain is in the right thumb.  She does use blood thinners but she is not sure the name of it.  She believes that it is Coumadin when I mention names.  She denies any weakness or numbness.  She denies any visual changes.  She states that she does have some pain in the back of her head.  She states it is painful when she moves her neck.  She denies any chest pain or shortness of breath.  No abdominal pain.  No nausea or vomiting.  No diarrhea or constipation but no urinary complaints.  No fever or chills.  No cough or congestion.  All pertinent positives and negatives are recorded above.  All other systems reviewed and otherwise negative.  Vital signs with hypertension but otherwise within normal limits.  Physical exam with a well-nourished well-developed female in no acute distress.  HEENT exam with a cephalohematoma to the back of her head but otherwise unremarkable.  She has no evidence of airway compromise or respiratory distress.  Abdominal exam is benign.  She has no gross motor, neurologic or vascular deficits on exam.  NIH stroke scale score of 0.  She does not any visible or palpable bony deformities but does have a large hematoma to the right thumb.      Limited trauma activation was initiated by the EMS quarterback.  Dr Muller did respond to the trauma activation      Diagnostic labs with mild anemia and evidence of a possible urinary tract infection but otherwise unremarkable.      IV Rocephin was ordered.      CT head wo IV contrast   Final Result   No acute intracranial abnormality.        MACRO:   None.        Signed by: Chance Jiménez 1/17/2024 5:46  PM   Dictation workstation:   [id number]      CT cervical spine wo IV contrast   Final Result   1. No acute fracture or spondylolisthesis.   2. Degenerative disc disease and spondylosis.             Signed by: Chance Jiménez 1/17/2024 5:47 PM   Dictation workstation:   [id number]      XR chest 1 view   Final Result   No acute cardiopulmonary disease.        Signed by: Sameer Wheeler 1/17/2024 5:18 PM   Dictation workstation:   [id number]      XR hand right 3+ views   Final Result   Acute fracture through the proximal diaphysis of the distal phalanx   of the thumb. There is also a minimally displaced/angulated fracture   through the proximal diaphysis of the 1st metacarpal.        Signed by: Sameer Wheeler 1/17/2024 5:25 PM   Dictation workstation:   [id number]      XR pelvis 1-2 views   Final Result   No evidence for acute fracture or dislocation.             Signed by: Sameer Wheeler 1/17/2024 5:12 PM   Dictation workstation:   [id number]           Stick labs with evidence of a possible urinary tract infection but otherwise unremarkable.  The patient does not have any evidence of intracranial hemorrhage or skull fracture on CT head.  No evidence of fracture or dislocation on CT C-spine.  Chest x-ray without acute process.  No evidence of pneumonia or pneumothorax.  No evidence of rib fracture.  The pelvis x-ray does not show any evidence of fracture or dislocation.  The patient does have a fracture of the proximal diaphysis of the distal phalanx of the thumb on the right hand x-ray.  The patient also has a minimally displaced angulated fracture of the proximal diaphysis of the first metacarpal.  She is able to walk and stand without difficulty.  She is able to converse without difficulty.        A splint was applied by nursing staff.  The patient was neurovascular intact after splint application.      The patient was released in good condition in the company of a family member.  She will follow-up  with her primary care physician within 1 to 2 days for further management of her current symptoms and repeat check of her blood pressure.  She was also given a referral to orthopedics for further management of her hand injury.  She will return to the emergency department sooner with worsening of symptoms or onset of new symptoms.  Rx given for Norco for pain control.        Impression/diagnosis  Fall on the steps, initial encounter  Closed head injury  Right first metacarpal fracture and right proximal phalanx fracture of the thumb.  Hypertension, unspecified  Acute lower urinary tract infection      Critical care time of  23 minutes billed for management of trauma activation with consultation with the trauma surgeon, consultation with the patient regarding results, monitoring the patient in the emergency department and evaluation of the patient for any critical injuries.  This time excludes time for billable procedures.      critical care time billed for by me is non concurrent with time billed for by ROVERTO Johnson    I personally saw the patient and made/approve the management plan and take responsibility for the patient management.      I independently interpreted the following study (S): Diagnostic labs      I personally discussed the patient's management with the patient and the trauma surgeon      I reviewed the results of the diagnostic labs and diagnostic imaging.  Formal radiology read was completed by the radiologist.      Sandra Prasad MD

## 2024-01-18 LAB — HOLD SPECIMEN: NORMAL

## 2024-01-18 NOTE — DISCHARGE INSTRUCTIONS
You have been splinted here in the Emergency Department.      You should NOT take this splint off until seen by a specialist for further evaluation as discussed.  Allow for the specialist to remove the splint.  Keep splint clean and dry.  If you wish to shower, use a garbage bag or other such method to ensure the splint stays dry.        Be sure to return to the ER without delay if you feel like your splint is too tight, your fingers/toes are turning colors or you begin to loose sensation or motor function of the affected area.      If you have been provided with a sling (based on the specific injury) wear the sling as often as possible and ensure prompt follow as directed.  *(if you have not been provided with a sling please disregard)      Be sure to follow up as directed in 1-2 days.  All of the details of your follow up instructions are detailed in the follow up section of this packet.     If you are being discharged with any pains medications or muscle relaxers (norco, Vicodin, hydrocodone products, Percocet, oxycodone products, flexeril, cyclobenzaprine, robaxin, norflex, brand or generic, or any other pain controlling medications with the exception of Ibuprofen and regular Tylenol, do not drive or operate machinery, climb ladders or participate in any activity that could potentially put yourself or others at risk should you get dizzy, or be/feel impaired at all.        It is important to remember that your care does not end here and you must continue to monitor your condition closely. Please return to the emergency department for any worsening or concerning signs or symptoms as directed by our conversations and the discharge instructions. Otherwise please follow up with your doctor in 2 days if no better or worse. If you do not have a doctor please contact the referral number on your discharge instructions. Please contact any physician specialists provided in your discharge notes as it is very important to  follow up with them regarding your condition. If you are unable to reach the physicians provided, please come back to the Emergency Department at any time.        Return to emergency room without delay for ANY new or worsening pains or for any other symptoms or concerns.

## 2024-01-19 LAB — BACTERIA UR CULT: NO GROWTH

## 2024-01-19 RX ORDER — MECLIZINE HYDROCHLORIDE 25 MG/1
TABLET ORAL EVERY 8 HOURS PRN
COMMUNITY
Start: 2018-01-02 | End: 2024-01-22 | Stop reason: ALTCHOICE

## 2024-01-19 RX ORDER — TIZANIDINE 2 MG/1
TABLET ORAL EVERY 12 HOURS
COMMUNITY
Start: 2023-02-24 | End: 2024-01-22 | Stop reason: ALTCHOICE

## 2024-01-19 RX ORDER — FUROSEMIDE 40 MG/1
TABLET ORAL EVERY 12 HOURS
COMMUNITY
Start: 2021-11-08 | End: 2024-01-22 | Stop reason: ALTCHOICE

## 2024-01-19 RX ORDER — CALCIUM CARBONATE 600 MG
600 TABLET ORAL
COMMUNITY
End: 2024-04-15 | Stop reason: ALTCHOICE

## 2024-01-19 RX ORDER — LORATADINE 10 MG/1
TABLET ORAL EVERY 24 HOURS
COMMUNITY
Start: 2023-02-24 | End: 2024-01-22 | Stop reason: ALTCHOICE

## 2024-01-19 RX ORDER — CALCIUM CARBONATE 300MG(750)
10 TABLET,CHEWABLE ORAL
COMMUNITY

## 2024-01-19 RX ORDER — NAPROXEN SODIUM 220 MG
TABLET ORAL EVERY 12 HOURS
COMMUNITY
Start: 2023-02-24 | End: 2024-01-22 | Stop reason: ALTCHOICE

## 2024-01-19 RX ORDER — UBIDECARENONE 75 MG
CAPSULE ORAL
COMMUNITY
End: 2024-02-19 | Stop reason: ALTCHOICE

## 2024-01-19 RX ORDER — POTASSIUM &MAGNESIUM ASPARTATE 250-250 MG
CAPSULE ORAL
COMMUNITY
End: 2024-06-05 | Stop reason: ALTCHOICE

## 2024-01-19 RX ORDER — FAMOTIDINE 20 MG/1
TABLET, FILM COATED ORAL EVERY 24 HOURS
COMMUNITY
End: 2024-01-22 | Stop reason: ALTCHOICE

## 2024-01-19 RX ORDER — CALCIUM CARBONATE/VITAMIN D3 500-10/5ML
LIQUID (ML) ORAL
COMMUNITY
End: 2024-06-05 | Stop reason: ALTCHOICE

## 2024-01-19 RX ORDER — LIDOCAINE 560 MG/1
PATCH PERCUTANEOUS; TOPICAL; TRANSDERMAL
COMMUNITY
End: 2024-01-22 | Stop reason: ALTCHOICE

## 2024-01-19 RX ORDER — DEXTROMETHORPHAN HYDROBROMIDE, GUAIFENESIN 5; 100 MG/5ML; MG/5ML
LIQUID ORAL EVERY 6 HOURS PRN
COMMUNITY
Start: 2023-02-24 | End: 2024-01-22 | Stop reason: ALTCHOICE

## 2024-01-19 RX ORDER — FLUTICASONE PROPIONATE 50 MCG
SPRAY, SUSPENSION (ML) NASAL
COMMUNITY
Start: 2023-02-24 | End: 2024-01-22 | Stop reason: ALTCHOICE

## 2024-01-19 RX ORDER — ASPIRIN 81 MG/1
TABLET ORAL
COMMUNITY
End: 2024-06-05 | Stop reason: DRUGHIGH

## 2024-01-22 ENCOUNTER — OFFICE VISIT (OUTPATIENT)
Dept: ORTHOPEDIC SURGERY | Facility: CLINIC | Age: 89
End: 2024-01-22
Payer: MEDICARE

## 2024-01-22 VITALS — WEIGHT: 139.8 LBS | HEIGHT: 62 IN | BODY MASS INDEX: 25.73 KG/M2

## 2024-01-22 DIAGNOSIS — S62.514A NONDISPLACED FRACTURE OF PROXIMAL PHALANX OF RIGHT THUMB, INITIAL ENCOUNTER FOR CLOSED FRACTURE: ICD-10-CM

## 2024-01-22 DIAGNOSIS — S62.339S: ICD-10-CM

## 2024-01-22 DIAGNOSIS — M79.641 RIGHT HAND PAIN: Primary | ICD-10-CM

## 2024-01-22 PROCEDURE — 1036F TOBACCO NON-USER: CPT | Performed by: ORTHOPAEDIC SURGERY

## 2024-01-22 PROCEDURE — 1126F AMNT PAIN NOTED NONE PRSNT: CPT | Performed by: ORTHOPAEDIC SURGERY

## 2024-01-22 PROCEDURE — 1159F MED LIST DOCD IN RCRD: CPT | Performed by: ORTHOPAEDIC SURGERY

## 2024-01-22 PROCEDURE — 1160F RVW MEDS BY RX/DR IN RCRD: CPT | Performed by: ORTHOPAEDIC SURGERY

## 2024-01-22 PROCEDURE — 26600 TREAT METACARPAL FRACTURE: CPT | Performed by: ORTHOPAEDIC SURGERY

## 2024-01-22 ASSESSMENT — PAIN SCALES - GENERAL: PAINLEVEL: 0-NO PAIN

## 2024-01-22 NOTE — PROGRESS NOTES
Subjective      Chief Complaint   Patient presents with    Right Hand - New Patient Visit     Pt was seen at hospitals on 1/17/24 for a fall.  Has two broken bones in her right hand.          No surgery found     HPI  This 89-year-old young woman fell on 1- and injured her right hand.  She hit her head but did not lose consciousness.  She was seen at [facility] emergency department where she was evaluated with x-rays and referred.  She comes in today with her friend and power of  and states that this right hand pain is very bothersome.    CARDIOLOGY:   Negative for chest pain, shortness of breath.   RESPIRATORY:   Negative for chest pain, shortness of breath.   MUSCULOSKELETAL:   See HPI for details.   NEUROLOGY:   Negative for tingling, numbness, weakness.    HEENT: The patient fell and did hit her head but did not lose consciousness.  Objective    There were no vitals filed for this visit.    Physical Exam  GENERAL:          General Appearance:  This is a pleasant patient with appropriate affect, in no acute distress.   DERMATOLOGY:          Skin: skin at the neck, upper and lower back, and trunk is intact. There is no evidence of skin rash, skin breakdown or ulceration, or atrophic skin change.   EXTREMITIES:          Vascular:  Right, left hands and feet are warm with good color and pulses. Right and left calf and thigh are nontender and nonswollen.   NEUROLOGICAL:          Orientation:  Patient is alert and oriented to person, place, time and situation. Right and left upper and lower extremity motor and sensory examinations are intact.      HEENT: No otorrhea or rhinorrhea.      MUSCULOSKELETAL: Neck: Nontender. No pain with range of motion. Right hand: The splint is removed.  There is tenderness at  the right first metacarpal and also of the phalanx of the right thumb.  Position is overall clinically satisfactory.  X-rays and imaging studies listed below are reviewed with the patient in the  office and with her friend and POA in the office today.    CT cervical spine wo IV contrast    Result Date: 1/17/2024  Interpreted By:  Chance Jiménez, STUDY: CT CERVICAL SPINE WO IV CONTRAST; 1/17/2024 5:21 pm   INDICATION: Signs/Symptoms:trama;   COMPARISON: None   ACCESSION NUMBER(S): [id number]   ORDERING CLINICIAN: GOKUL GARCIA   TECHNIQUE: Contiguous axial images were acquired from the skull base to the lung apices. All CT examinations are performed with 1 or more of the following dose reduction techniques: Automated exposure control, adjustment of mA and/or kv according to patient's size, or use of iterative reconstruction techniques.   FINDINGS: There is straightening of the normal cervical lordosis.  No acute fracture or spondylolisthesis is identified.     The occipital condyles, arch of C1, and the odontoid processes are intact. The atlantoaxial relationship is well maintained.   Moderate multilevel degenerative changes with osteophytic spurring and loss of disc height worsened of mid to lower cervical spine.   The visualized lung apices are unremarkable.       1. No acute fracture or spondylolisthesis. 2. Degenerative disc disease and spondylosis.     Signed by: Chance Jiménez 1/17/2024 5:47 PM Dictation workstation:   [id number]    CT head wo IV contrast    Result Date: 1/17/2024  Interpreted By:  Chance Jiménez, STUDY: CT HEAD WO IV CONTRAST;  1/17/2024 5:21 pm   INDICATION: Signs/Symptoms:trauma.   COMPARISON: None..   ACCESSION NUMBER(S): [id number]   ORDERING CLINICIAN: GOKUL GARCIA   TECHNIQUE: CT axial images through the Brain were obtained without contrast. All CT examinations are performed with 1 or more of the following dose reduction techniques: Automated exposure control, adjustment of mA and/or kv according to patient's size, or use of iterative reconstruction techniques.   FINDINGS: There is no mass effect, hemorrhage, or infarct. Gray-white differentiation is  maintained. There is diffuse prominence of the ventricles and sulci indicating the presence of mild diffuse cerebral volume loss. The basal cisterns are patent. Patchy areas of hypodense attenuation are seen in the subcortical and periventricular white matter; compatible with small vessel ischemic disease. The visualized paranasal sinuses appear clear.       No acute intracranial abnormality.   MACRO: None.   Signed by: Chance Jiménez 1/17/2024 5:46 PM Dictation workstation:   [id number]    XR hand right 3+ views    Result Date: 1/17/2024  Interpreted By:  Sameer Wheeler, STUDY: XR HAND RIGHT 3+ VIEWS; 1/17/2024 4:55 pm   INDICATION: Signs/Symptoms:trauma;   COMPARISON: None available.   ACCESSION NUMBER(S): [id number]   ORDERING CLINICIAN: GOKUL GARCIA   TECHNIQUE: Views:  AP, lateral, and oblique views of the right hand   FINDINGS: Acute fracture through the proximal diaphysis of the distal phalanx of the thumb. There is also a minimally displaced/angulated fracture through the proximal diaphysis of the 1st metacarpal.   Degenerative changes of the metacarpophalangeal joints, wrist joints, carpal bones, and interphalangeal joints with moderate to moderate-severe generalized osteoarthritis.       Acute fracture through the proximal diaphysis of the distal phalanx of the thumb. There is also a minimally displaced/angulated fracture through the proximal diaphysis of the 1st metacarpal.   Signed by: Sameer Wheeler 1/17/2024 5:25 PM Dictation workstation:   [id number]    XR chest 1 view    Result Date: 1/17/2024  Interpreted By:  Sameer Wheeler, STUDY: XR CHEST 1 VIEW; 1/17/2024 4:55 pm   INDICATION: Signs/Symptoms:trauma.   COMPARISON: None   ACCESSION NUMBER(S): [id number]   ORDERING CLINICIAN: GOKUL GARCIA   TECHNIQUE: 1 view of the chest was performed.   FINDINGS: The lungs are adequately inflated. No acute consolidation. No pleural effusion. No pneumothorax.  The cardiomediastinal  silhouette is within normal limits.       No acute cardiopulmonary disease.   Signed by: Sameer Wheeler 1/17/2024 5:18 PM Dictation workstation:   [id number]    XR pelvis 1-2 views    Result Date: 1/17/2024  Interpreted By:  Sameer Wheeler, STUDY: XR PELVIS 1-2 VIEWS; 1/17/2024 4:55 pm   INDICATION: Signs/Symptoms:trauma; Pain   COMPARISON: None available.   ACCESSION NUMBER(S): [id number]   ORDERING CLINICIAN: GOKUL GARCIA   TECHNIQUE: Views: AP view of the pelvis.   FINDINGS: RESULTS: There is no evidence for acute fracture or dislocation. There is a nonspecific bowel gas pattern.   Pelvic bones appear intact. Hip joints show mild degenerative changes without acute osseous abnormality. Proximal femurs are intact. Degenerative changes of the lumbar spine.       No evidence for acute fracture or dislocation.     Signed by: Sameer Wheeler 1/17/2024 5:12 PM Dictation workstation:   [id number]       Claudine was seen today for new patient visit.  Diagnoses and all orders for this visit:  Right hand pain (Primary)  Nondisplaced fracture of proximal phalanx of right thumb, initial encounter for closed fracture  Fracture of metacarpal neck of right hand, closed, sequela     Options are discussed with the patient   And her friend and POA in the office today.  An initial attempt at nonoperative treatment with application of a splint to the right thumb  with its risks, benefits, alternatives (operative treatment which the patient refuses and I agree) and the fact that no guarantee could be made and that some cosmetic deformity will likely result were all discussed with the patient and her POA in the office today.  The patient refuses operative treatment (I agree with her) and wishes to proceed with  an initial attempt at nonoperative treatment with splinting. patient's friend and POA and I both agree.  A splint is applied to the right thumb in the office today.The patient is instructed regarding activity  modification and risk for further injury with falling or trauma, ice, physician directed at home gentle strengthening and ROM exercises, and the appropriate use of Tylenol as needed for pain with its potential adverse reactions and side effects. The patient understands.   Return in 4 weeks for splint removal and kerri-ray or sooner as needed.Please note that this report has been produced using speech recognition software.  It may contain errors related to grammar, punctuation or spelling.  Electronically signed, but not reviewed.    Tomas Carlisle MD

## 2024-02-12 ENCOUNTER — TELEPHONE (OUTPATIENT)
Dept: PRIMARY CARE | Facility: CLINIC | Age: 89
End: 2024-02-12
Payer: MEDICARE

## 2024-02-12 ENCOUNTER — LAB (OUTPATIENT)
Dept: LAB | Facility: LAB | Age: 89
End: 2024-02-12
Payer: MEDICARE

## 2024-02-12 DIAGNOSIS — R30.0 DYSURIA: ICD-10-CM

## 2024-02-12 DIAGNOSIS — R30.0 DYSURIA: Primary | ICD-10-CM

## 2024-02-12 LAB
APPEARANCE UR: CLEAR
BILIRUB UR STRIP.AUTO-MCNC: NEGATIVE MG/DL
COLOR UR: YELLOW
GLUCOSE UR STRIP.AUTO-MCNC: NORMAL MG/DL
KETONES UR STRIP.AUTO-MCNC: NEGATIVE MG/DL
LEUKOCYTE ESTERASE UR QL STRIP.AUTO: ABNORMAL
MUCOUS THREADS #/AREA URNS AUTO: ABNORMAL /LPF
NITRITE UR QL STRIP.AUTO: NEGATIVE
PH UR STRIP.AUTO: 5.5 [PH]
PROT UR STRIP.AUTO-MCNC: ABNORMAL MG/DL
RBC # UR STRIP.AUTO: ABNORMAL /UL
RBC #/AREA URNS AUTO: ABNORMAL /HPF
SP GR UR STRIP.AUTO: 1.02
SQUAMOUS #/AREA URNS AUTO: ABNORMAL /HPF
TRANS CELLS #/AREA UR COMP ASSIST: ABNORMAL /HPF
UROBILINOGEN UR STRIP.AUTO-MCNC: NORMAL MG/DL
WBC #/AREA URNS AUTO: ABNORMAL /HPF

## 2024-02-12 PROCEDURE — 87086 URINE CULTURE/COLONY COUNT: CPT

## 2024-02-12 PROCEDURE — 81001 URINALYSIS AUTO W/SCOPE: CPT

## 2024-02-12 NOTE — TELEPHONE ENCOUNTER
Patient's POA called office stating that patient urinary itching, urinary frequency and urinary pain since last week. Millie requesting Rx for atb.

## 2024-02-12 NOTE — TELEPHONE ENCOUNTER
Patient needs to provide a urine sample. Order written for her to do it at the lab. Unable to provide an antibiotic without this being done. Thanks.

## 2024-02-13 LAB — BACTERIA UR CULT: NORMAL

## 2024-02-14 PROBLEM — S16.1XXA STRAIN OF NECK MUSCLE: Status: ACTIVE | Noted: 2024-02-14

## 2024-02-14 PROBLEM — I67.9 CEREBROVASCULAR DISEASE: Status: ACTIVE | Noted: 2024-02-14

## 2024-02-14 PROBLEM — B99.9 INFECTION: Status: ACTIVE | Noted: 2024-02-14

## 2024-02-14 PROBLEM — K21.9 GASTROESOPHAGEAL REFLUX DISEASE WITHOUT ESOPHAGITIS: Status: ACTIVE | Noted: 2024-02-14

## 2024-02-14 PROBLEM — E86.0 DEHYDRATION: Status: ACTIVE | Noted: 2024-02-14

## 2024-02-14 PROBLEM — R42 DIZZINESS AND GIDDINESS: Status: ACTIVE | Noted: 2024-02-14

## 2024-02-14 PROBLEM — E87.6 HYPOKALEMIA: Status: ACTIVE | Noted: 2024-02-14

## 2024-02-14 PROBLEM — R73.03 PREDIABETES: Status: ACTIVE | Noted: 2024-02-14

## 2024-02-14 PROBLEM — R40.1 CLOUDED CONSCIOUSNESS: Status: ACTIVE | Noted: 2024-02-14

## 2024-02-14 PROBLEM — R11.2 NAUSEA & VOMITING: Status: ACTIVE | Noted: 2024-02-14

## 2024-02-14 PROBLEM — I63.9 ISCHEMIC CEREBROVASCULAR ACCIDENT (CVA) (MULTI): Status: ACTIVE | Noted: 2024-02-14
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
## 2024-02-14 PROBLEM — R53.1 WEAKNESS: Status: ACTIVE | Noted: 2024-02-14

## 2024-02-14 PROBLEM — R50.9 FEVER: Status: ACTIVE | Noted: 2024-02-14

## 2024-02-14 PROBLEM — R60.0 EDEMA OF LOWER EXTREMITY: Status: ACTIVE | Noted: 2024-02-14

## 2024-02-14 PROBLEM — S09.90XA INJURY OF HEAD: Status: ACTIVE | Noted: 2024-02-14

## 2024-02-14 PROBLEM — E86.1 HYPOVOLEMIA: Status: ACTIVE | Noted: 2024-02-14

## 2024-02-14 PROBLEM — M15.0 PRIMARY OSTEOARTHRITIS INVOLVING MULTIPLE JOINTS: Status: ACTIVE | Noted: 2024-02-14

## 2024-02-14 PROBLEM — R30.0 DYSURIA: Status: ACTIVE | Noted: 2023-10-11

## 2024-02-14 PROBLEM — N18.9 CHRONIC RENAL FAILURE SYNDROME: Status: ACTIVE | Noted: 2023-10-11

## 2024-02-14 PROBLEM — E83.52 HYPERCALCEMIA: Status: ACTIVE | Noted: 2023-04-10

## 2024-02-14 PROBLEM — M15.9 PRIMARY OSTEOARTHRITIS INVOLVING MULTIPLE JOINTS: Status: ACTIVE | Noted: 2024-02-14

## 2024-02-14 PROBLEM — N39.0 ACUTE LOWER URINARY TRACT INFECTION: Status: ACTIVE | Noted: 2024-02-14

## 2024-02-14 PROBLEM — J30.2 SEASONAL ALLERGIES: Status: ACTIVE | Noted: 2024-02-14

## 2024-02-14 PROBLEM — E87.1 HYPONATREMIA: Status: ACTIVE | Noted: 2024-02-14

## 2024-02-14 PROBLEM — K57.92 DIVERTICULITIS: Status: ACTIVE | Noted: 2024-02-14

## 2024-02-14 PROBLEM — M25.519 PAIN IN UNSPECIFIED SHOULDER: Status: ACTIVE | Noted: 2023-02-24

## 2024-02-14 PROBLEM — K57.32 DIVERTICULITIS OF COLON: Status: ACTIVE | Noted: 2024-02-14

## 2024-02-14 PROBLEM — E78.5 DYSLIPIDEMIA: Status: ACTIVE | Noted: 2024-02-14

## 2024-02-14 RX ORDER — AMOXICILLIN 500 MG/1
CAPSULE ORAL
COMMUNITY
End: 2024-04-15 | Stop reason: ALTCHOICE

## 2024-02-14 RX ORDER — CEPHALEXIN 500 MG/1
500 CAPSULE ORAL 4 TIMES DAILY
Qty: 20 CAPSULE | Refills: 0 | Status: CANCELLED | OUTPATIENT
Start: 2024-02-14 | End: 2024-02-19

## 2024-02-14 RX ORDER — FLUCONAZOLE 150 MG/1
TABLET ORAL
COMMUNITY
Start: 2023-10-11 | End: 2024-06-05 | Stop reason: ALTCHOICE

## 2024-02-14 RX ORDER — LANOLIN ALCOHOL/MO/W.PET/CERES
CREAM (GRAM) TOPICAL EVERY 24 HOURS
COMMUNITY
End: 2024-06-05 | Stop reason: DRUGHIGH

## 2024-02-15 ENCOUNTER — TELEPHONE (OUTPATIENT)
Dept: PRIMARY CARE | Facility: CLINIC | Age: 89
End: 2024-02-15
Payer: MEDICARE

## 2024-02-15 DIAGNOSIS — B37.9 YEAST INFECTION: Primary | ICD-10-CM

## 2024-02-15 RX ORDER — FLUCONAZOLE 150 MG/1
150 TABLET ORAL ONCE
Qty: 1 TABLET | Refills: 0 | Status: SHIPPED | OUTPATIENT
Start: 2024-02-15 | End: 2024-02-15

## 2024-02-15 NOTE — TELEPHONE ENCOUNTER
Millie requesting UA results and also states that she believes pt may have a yeast infection; c/o vaginal itching. Requesting Rx for that as well. Please advise.

## 2024-02-15 NOTE — TELEPHONE ENCOUNTER
Patient's urine culture showed no growth. The antibiotics probably gave her a yeast infection. Will send diflucan.

## 2024-02-19 ENCOUNTER — HOSPITAL ENCOUNTER (OUTPATIENT)
Dept: RADIOLOGY | Facility: CLINIC | Age: 89
Discharge: HOME | End: 2024-02-19
Payer: MEDICARE

## 2024-02-19 ENCOUNTER — OFFICE VISIT (OUTPATIENT)
Dept: ORTHOPEDIC SURGERY | Facility: CLINIC | Age: 89
End: 2024-02-19
Payer: MEDICARE

## 2024-02-19 VITALS — WEIGHT: 139.77 LBS | BODY MASS INDEX: 25.56 KG/M2

## 2024-02-19 DIAGNOSIS — S62.339S: ICD-10-CM

## 2024-02-19 DIAGNOSIS — S62.514A NONDISPLACED FRACTURE OF PROXIMAL PHALANX OF RIGHT THUMB, INITIAL ENCOUNTER FOR CLOSED FRACTURE: ICD-10-CM

## 2024-02-19 DIAGNOSIS — S62.509A THUMB FRACTURE: ICD-10-CM

## 2024-02-19 DIAGNOSIS — M79.641 RIGHT HAND PAIN: Primary | ICD-10-CM

## 2024-02-19 PROCEDURE — 1159F MED LIST DOCD IN RCRD: CPT | Performed by: ORTHOPAEDIC SURGERY

## 2024-02-19 PROCEDURE — 1036F TOBACCO NON-USER: CPT | Performed by: ORTHOPAEDIC SURGERY

## 2024-02-19 PROCEDURE — 1126F AMNT PAIN NOTED NONE PRSNT: CPT | Performed by: ORTHOPAEDIC SURGERY

## 2024-02-19 PROCEDURE — 99024 POSTOP FOLLOW-UP VISIT: CPT | Performed by: ORTHOPAEDIC SURGERY

## 2024-02-19 PROCEDURE — 1160F RVW MEDS BY RX/DR IN RCRD: CPT | Performed by: ORTHOPAEDIC SURGERY

## 2024-02-19 PROCEDURE — 1157F ADVNC CARE PLAN IN RCRD: CPT | Performed by: ORTHOPAEDIC SURGERY

## 2024-02-19 PROCEDURE — 73140 X-RAY EXAM OF FINGER(S): CPT | Mod: RT

## 2024-02-19 ASSESSMENT — PATIENT HEALTH QUESTIONNAIRE - PHQ9
2. FEELING DOWN, DEPRESSED OR HOPELESS: NOT AT ALL
1. LITTLE INTEREST OR PLEASURE IN DOING THINGS: NOT AT ALL
SUM OF ALL RESPONSES TO PHQ9 QUESTIONS 1 AND 2: 0

## 2024-02-19 ASSESSMENT — PAIN SCALES - GENERAL: PAINLEVEL_OUTOF10: 2

## 2024-02-19 ASSESSMENT — ENCOUNTER SYMPTOMS
DEPRESSION: 0
OCCASIONAL FEELINGS OF UNSTEADINESS: 0
LOSS OF SENSATION IN FEET: 0

## 2024-02-19 ASSESSMENT — PAIN DESCRIPTION - DESCRIPTORS: DESCRIPTORS: ACHING

## 2024-02-19 ASSESSMENT — LIFESTYLE VARIABLES: TOTAL SCORE: 0

## 2024-02-19 ASSESSMENT — PAIN - FUNCTIONAL ASSESSMENT: PAIN_FUNCTIONAL_ASSESSMENT: 0-10

## 2024-02-19 NOTE — PROGRESS NOTES
Subjective      Chief Complaint   Patient presents with    Right Hand - Follow-up     Right thumb        No surgery found     HPI  This 89-year-old young woman fell on 1- and injured her right hand.  She hit her head but did not lose consciousness.  She was seen at [facility] emergency department where she was evaluated with x-rays and referred.  She comes in today with her friend and power of  and states that this right hand pain improved in the splint. She presents today for re-xray.    CARDIOLOGY:   Negative for chest pain, shortness of breath.   RESPIRATORY:   Negative for chest pain, shortness of breath.   MUSCULOSKELETAL:   See HPI for details.   NEUROLOGY:   Negative for tingling, numbness, weakness.    HEENT: The patient fell and did hit her head but did not lose consciousness.  Objective    There were no vitals filed for this visit.    Physical Exam  GENERAL:          General Appearance:  This is a pleasant patient with appropriate affect, in no acute distress.   DERMATOLOGY:          Skin: skin at the neck, upper and lower back, and trunk is intact. There is no evidence of skin rash, skin breakdown or ulceration, or atrophic skin change.   EXTREMITIES:          Vascular:  Right, left hands and feet are warm with good color and pulses. Right and left calf and thigh are nontender and nonswollen.   NEUROLOGICAL:          Orientation:  Patient is alert and oriented to person, place, time and situation. Right and left upper and lower extremity motor and sensory examinations are intact.      HEENT: No otorrhea or rhinorrhea.      MUSCULOSKELETAL: Neck: Nontender. No pain with range of motion. Right hand: The splint is removed.  There is no tenderness at  the right first metacarpal.  Position is overall clinically satisfactory.  X-rays of the right hand done and read in the office today after splint removal show that the previously noted fracture of the right first metacarpal is unchanged in  position and alignment.  Early callus appears to be present and fracture line appears to be less visible than on previous x-rays consistent with healing.  Fracture line is still somewhat visible but the patient has no tenderness.  I reviewed these x-rays as well as x-rays and imaging studies listed below are reviewed with the patient in the office and with her friend and POA in the office today.    CT cervical spine wo IV contrast    Result Date: 1/17/2024  Interpreted By:  Chance Jiménez, STUDY: CT CERVICAL SPINE WO IV CONTRAST; 1/17/2024 5:21 pm   INDICATION: Signs/Symptoms:trama;   COMPARISON: None   ACCESSION NUMBER(S): [id number]   ORDERING CLINICIAN: GOKUL GARCIA   TECHNIQUE: Contiguous axial images were acquired from the skull base to the lung apices. All CT examinations are performed with 1 or more of the following dose reduction techniques: Automated exposure control, adjustment of mA and/or kv according to patient's size, or use of iterative reconstruction techniques.   FINDINGS: There is straightening of the normal cervical lordosis.  No acute fracture or spondylolisthesis is identified.     The occipital condyles, arch of C1, and the odontoid processes are intact. The atlantoaxial relationship is well maintained.   Moderate multilevel degenerative changes with osteophytic spurring and loss of disc height worsened of mid to lower cervical spine.   The visualized lung apices are unremarkable.       1. No acute fracture or spondylolisthesis. 2. Degenerative disc disease and spondylosis.     Signed by: Chance Jiménez 1/17/2024 5:47 PM Dictation workstation:   [id number]    CT head wo IV contrast    Result Date: 1/17/2024  Interpreted By:  Chance Jiménez, STUDY: CT HEAD WO IV CONTRAST;  1/17/2024 5:21 pm   INDICATION: Signs/Symptoms:trauma.   COMPARISON: None..   ACCESSION NUMBER(S): [id number]   ORDERING CLINICIAN: GOKUL GARCIA   TECHNIQUE: CT axial images through the Brain were  obtained without contrast. All CT examinations are performed with 1 or more of the following dose reduction techniques: Automated exposure control, adjustment of mA and/or kv according to patient's size, or use of iterative reconstruction techniques.   FINDINGS: There is no mass effect, hemorrhage, or infarct. Gray-white differentiation is maintained. There is diffuse prominence of the ventricles and sulci indicating the presence of mild diffuse cerebral volume loss. The basal cisterns are patent. Patchy areas of hypodense attenuation are seen in the subcortical and periventricular white matter; compatible with small vessel ischemic disease. The visualized paranasal sinuses appear clear.       No acute intracranial abnormality.   MACRO: None.   Signed by: Chance Jiménez 1/17/2024 5:46 PM Dictation workstation:   [id number]    XR hand right 3+ views    Result Date: 1/17/2024  Interpreted By:  Sameer Wheeler, STUDY: XR HAND RIGHT 3+ VIEWS; 1/17/2024 4:55 pm   INDICATION: Signs/Symptoms:trauma;   COMPARISON: None available.   ACCESSION NUMBER(S): [id number]   ORDERING CLINICIAN: GOKUL GARCIA   TECHNIQUE: Views:  AP, lateral, and oblique views of the right hand   FINDINGS: Acute fracture through the proximal diaphysis of the distal phalanx of the thumb. There is also a minimally displaced/angulated fracture through the proximal diaphysis of the 1st metacarpal.   Degenerative changes of the metacarpophalangeal joints, wrist joints, carpal bones, and interphalangeal joints with moderate to moderate-severe generalized osteoarthritis.       Acute fracture through the proximal diaphysis of the distal phalanx of the thumb. There is also a minimally displaced/angulated fracture through the proximal diaphysis of the 1st metacarpal.   Signed by: Sameer Wheeler 1/17/2024 5:25 PM Dictation workstation:   [id number]    XR chest 1 view    Result Date: 1/17/2024  Interpreted By:  Sameer Wheeler, STUDY: XR CHEST 1  VIEW; 1/17/2024 4:55 pm   INDICATION: Signs/Symptoms:trauma.   COMPARISON: None   ACCESSION NUMBER(S): [id number]   ORDERING CLINICIAN: GOKUL GARCIA   TECHNIQUE: 1 view of the chest was performed.   FINDINGS: The lungs are adequately inflated. No acute consolidation. No pleural effusion. No pneumothorax.  The cardiomediastinal silhouette is within normal limits.       No acute cardiopulmonary disease.   Signed by: Sameer Wheeler 1/17/2024 5:18 PM Dictation workstation:   [id number]    XR pelvis 1-2 views    Result Date: 1/17/2024  Interpreted By:  Sameer Wheeler, STUDY: XR PELVIS 1-2 VIEWS; 1/17/2024 4:55 pm   INDICATION: Signs/Symptoms:trauma; Pain   COMPARISON: None available.   ACCESSION NUMBER(S): [id number]   ORDERING CLINICIAN: GOKUL GARCIA   TECHNIQUE: Views: AP view of the pelvis.   FINDINGS: RESULTS: There is no evidence for acute fracture or dislocation. There is a nonspecific bowel gas pattern.   Pelvic bones appear intact. Hip joints show mild degenerative changes without acute osseous abnormality. Proximal femurs are intact. Degenerative changes of the lumbar spine.       No evidence for acute fracture or dislocation.     Signed by: Sameer Wheeler 1/17/2024 5:12 PM Dictation workstation:   [id number]       Claudine was seen today for follow-up.  Diagnoses and all orders for this visit:  Right hand pain (Primary)  Nondisplaced fracture of proximal phalanx of right thumb, initial encounter for closed fracture  Fracture of metacarpal neck of right hand, closed, sequela     Options are discussed with the patient  and her friend and POA in the office today. The patient is instructed regarding activity modification and risk for further injury with falling or trauma, use of an over-the-counter wrist/hand splint as needed for pain and support, ice, physician directed at home gentle strengthening and ROM exercises, and the appropriate use of Tylenol as needed for pain with its  potential adverse reactions and side effects. The patient understands.   Return as needed.Please note that this report has been produced using speech recognition software.  It may contain errors related to grammar, punctuation or spelling.  Electronically signed, but not reviewed.    Tomas Carlisle MD
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
## 2024-03-25 PROBLEM — S09.90XA CLOSED HEAD INJURY: Status: ACTIVE | Noted: 2024-03-25

## 2024-03-25 PROBLEM — T14.8XXA HEMATOMA: Status: ACTIVE | Noted: 2024-03-25

## 2024-03-25 RX ORDER — LIDOCAINE 560 MG/1
PATCH PERCUTANEOUS; TOPICAL; TRANSDERMAL
COMMUNITY
End: 2024-04-15 | Stop reason: ALTCHOICE

## 2024-03-25 RX ORDER — FAMOTIDINE 20 MG/1
TABLET, FILM COATED ORAL EVERY 24 HOURS
COMMUNITY
End: 2024-06-05 | Stop reason: DRUGHIGH

## 2024-03-25 RX ORDER — LORATADINE 10 MG/1
TABLET ORAL EVERY 24 HOURS
COMMUNITY
Start: 2023-02-24 | End: 2024-06-05 | Stop reason: DRUGHIGH

## 2024-03-25 RX ORDER — NAPROXEN SODIUM 220 MG
TABLET ORAL EVERY 12 HOURS
COMMUNITY
Start: 2023-02-24 | End: 2024-04-15 | Stop reason: ALTCHOICE

## 2024-04-06 ENCOUNTER — LAB (OUTPATIENT)
Dept: LAB | Facility: LAB | Age: 89
End: 2024-04-06
Payer: MEDICARE

## 2024-04-06 DIAGNOSIS — I10 ESSENTIAL HYPERTENSION: ICD-10-CM

## 2024-04-06 DIAGNOSIS — E78.2 MIXED HYPERLIPIDEMIA: ICD-10-CM

## 2024-04-06 DIAGNOSIS — E55.9 VITAMIN D DEFICIENCY: ICD-10-CM

## 2024-04-06 DIAGNOSIS — D64.9 NORMOCYTIC ANEMIA: ICD-10-CM

## 2024-04-06 DIAGNOSIS — Z01.89 ENCOUNTER FOR ROUTINE LABORATORY TESTING: ICD-10-CM

## 2024-04-06 LAB
25(OH)D3 SERPL-MCNC: 53 NG/ML (ref 31–100)
ALBUMIN SERPL-MCNC: 4 G/DL (ref 3.5–5)
ALP BLD-CCNC: 75 U/L (ref 35–125)
ALT SERPL-CCNC: 12 U/L (ref 5–40)
ANION GAP SERPL CALC-SCNC: 13 MMOL/L
AST SERPL-CCNC: 18 U/L (ref 5–40)
BASOPHILS # BLD AUTO: 0.02 X10*3/UL (ref 0–0.1)
BASOPHILS NFR BLD AUTO: 0.3 %
BILIRUB DIRECT SERPL-MCNC: <0.2 MG/DL (ref 0–0.2)
BILIRUB SERPL-MCNC: 0.2 MG/DL (ref 0.1–1.2)
BUN SERPL-MCNC: 21 MG/DL (ref 8–25)
CALCIUM SERPL-MCNC: 9.7 MG/DL (ref 8.5–10.4)
CHLORIDE SERPL-SCNC: 100 MMOL/L (ref 97–107)
CO2 SERPL-SCNC: 27 MMOL/L (ref 24–31)
CREAT SERPL-MCNC: 0.8 MG/DL (ref 0.4–1.6)
EGFRCR SERPLBLD CKD-EPI 2021: 69 ML/MIN/1.73M*2
EOSINOPHIL # BLD AUTO: 0.18 X10*3/UL (ref 0–0.4)
EOSINOPHIL NFR BLD AUTO: 2.5 %
ERYTHROCYTE [DISTWIDTH] IN BLOOD BY AUTOMATED COUNT: 12 % (ref 11.5–14.5)
GLUCOSE SERPL-MCNC: 108 MG/DL (ref 65–99)
HCT VFR BLD AUTO: 40.1 % (ref 36–46)
HGB BLD-MCNC: 13.2 G/DL (ref 12–16)
IMM GRANULOCYTES # BLD AUTO: 0.01 X10*3/UL (ref 0–0.5)
IMM GRANULOCYTES NFR BLD AUTO: 0.1 % (ref 0–0.9)
LYMPHOCYTES # BLD AUTO: 3.7 X10*3/UL (ref 0.8–3)
LYMPHOCYTES NFR BLD AUTO: 52 %
MCH RBC QN AUTO: 30 PG (ref 26–34)
MCHC RBC AUTO-ENTMCNC: 32.9 G/DL (ref 32–36)
MCV RBC AUTO: 91 FL (ref 80–100)
MONOCYTES # BLD AUTO: 0.46 X10*3/UL (ref 0.05–0.8)
MONOCYTES NFR BLD AUTO: 6.5 %
NEUTROPHILS # BLD AUTO: 2.75 X10*3/UL (ref 1.6–5.5)
NEUTROPHILS NFR BLD AUTO: 38.6 %
NRBC BLD-RTO: 0 /100 WBCS (ref 0–0)
PLATELET # BLD AUTO: 274 X10*3/UL (ref 150–450)
POTASSIUM SERPL-SCNC: 5 MMOL/L (ref 3.4–5.1)
PROT SERPL-MCNC: 6.6 G/DL (ref 5.9–7.9)
RBC # BLD AUTO: 4.4 X10*6/UL (ref 4–5.2)
SODIUM SERPL-SCNC: 140 MMOL/L (ref 133–145)
WBC # BLD AUTO: 7.1 X10*3/UL (ref 4.4–11.3)

## 2024-04-06 PROCEDURE — 36415 COLL VENOUS BLD VENIPUNCTURE: CPT

## 2024-04-06 PROCEDURE — 80053 COMPREHEN METABOLIC PANEL: CPT

## 2024-04-06 PROCEDURE — 85025 COMPLETE CBC W/AUTO DIFF WBC: CPT

## 2024-04-06 PROCEDURE — 82306 VITAMIN D 25 HYDROXY: CPT

## 2024-04-06 PROCEDURE — 82248 BILIRUBIN DIRECT: CPT

## 2024-04-08 ENCOUNTER — APPOINTMENT (OUTPATIENT)
Dept: PRIMARY CARE | Facility: CLINIC | Age: 89
End: 2024-04-08
Payer: MEDICARE

## 2024-04-15 ENCOUNTER — OFFICE VISIT (OUTPATIENT)
Dept: PRIMARY CARE | Facility: CLINIC | Age: 89
End: 2024-04-15
Payer: MEDICARE

## 2024-04-15 ENCOUNTER — LAB (OUTPATIENT)
Dept: LAB | Facility: LAB | Age: 89
End: 2024-04-15
Payer: MEDICARE

## 2024-04-15 VITALS
OXYGEN SATURATION: 98 % | DIASTOLIC BLOOD PRESSURE: 60 MMHG | WEIGHT: 133 LBS | HEART RATE: 65 BPM | HEIGHT: 62 IN | SYSTOLIC BLOOD PRESSURE: 138 MMHG | BODY MASS INDEX: 24.48 KG/M2 | TEMPERATURE: 97.3 F

## 2024-04-15 DIAGNOSIS — Z01.89 ENCOUNTER FOR ROUTINE LABORATORY TESTING: ICD-10-CM

## 2024-04-15 DIAGNOSIS — H81.13 BENIGN PAROXYSMAL POSITIONAL VERTIGO DUE TO BILATERAL VESTIBULAR DISORDER: ICD-10-CM

## 2024-04-15 DIAGNOSIS — Z71.89 COUNSELING REGARDING ADVANCED CARE PLANNING AND GOALS OF CARE: ICD-10-CM

## 2024-04-15 DIAGNOSIS — Z79.899 POLYPHARMACY: ICD-10-CM

## 2024-04-15 DIAGNOSIS — Z00.00 ANNUAL PHYSICAL EXAM: Primary | ICD-10-CM

## 2024-04-15 DIAGNOSIS — I10 PRIMARY HYPERTENSION: ICD-10-CM

## 2024-04-15 DIAGNOSIS — E55.9 VITAMIN D DEFICIENCY: ICD-10-CM

## 2024-04-15 DIAGNOSIS — M15.9 PRIMARY OSTEOARTHRITIS INVOLVING MULTIPLE JOINTS: ICD-10-CM

## 2024-04-15 DIAGNOSIS — I67.9 CEREBROVASCULAR DISEASE: ICD-10-CM

## 2024-04-15 DIAGNOSIS — K21.9 GASTROESOPHAGEAL REFLUX DISEASE WITHOUT ESOPHAGITIS: ICD-10-CM

## 2024-04-15 DIAGNOSIS — E78.2 MIXED HYPERLIPIDEMIA: ICD-10-CM

## 2024-04-15 DIAGNOSIS — D64.9 ANEMIA, UNSPECIFIED TYPE: ICD-10-CM

## 2024-04-15 DIAGNOSIS — Z23 ENCOUNTER FOR IMMUNIZATION: ICD-10-CM

## 2024-04-15 DIAGNOSIS — F41.9 ANXIETY: ICD-10-CM

## 2024-04-15 DIAGNOSIS — R73.03 PREDIABETES: ICD-10-CM

## 2024-04-15 PROBLEM — T14.8XXA HEMATOMA: Status: RESOLVED | Noted: 2024-03-25 | Resolved: 2024-04-15

## 2024-04-15 PROBLEM — E78.5 HLD (HYPERLIPIDEMIA): Status: ACTIVE | Noted: 2023-10-11

## 2024-04-15 PROBLEM — R40.1 CLOUDED CONSCIOUSNESS: Status: RESOLVED | Noted: 2024-02-14 | Resolved: 2024-04-15

## 2024-04-15 PROBLEM — R53.1 WEAKNESS: Status: RESOLVED | Noted: 2024-02-14 | Resolved: 2024-04-15

## 2024-04-15 PROBLEM — S62.339S: Status: RESOLVED | Noted: 2024-01-22 | Resolved: 2024-04-15

## 2024-04-15 PROBLEM — E86.0 DEHYDRATION: Status: RESOLVED | Noted: 2024-02-14 | Resolved: 2024-04-15

## 2024-04-15 PROBLEM — R11.2 NAUSEA & VOMITING: Status: RESOLVED | Noted: 2024-02-14 | Resolved: 2024-04-15

## 2024-04-15 PROBLEM — H81.10 BPPV (BENIGN PAROXYSMAL POSITIONAL VERTIGO): Status: ACTIVE | Noted: 2024-04-15

## 2024-04-15 PROBLEM — R50.9 FEVER: Status: RESOLVED | Noted: 2024-02-14 | Resolved: 2024-04-15

## 2024-04-15 PROBLEM — E86.1 HYPOVOLEMIA: Status: RESOLVED | Noted: 2024-02-14 | Resolved: 2024-04-15

## 2024-04-15 PROBLEM — Z87.19 HISTORY OF DIVERTICULOSIS: Status: ACTIVE | Noted: 2024-04-15

## 2024-04-15 PROBLEM — K57.92 DIVERTICULITIS: Status: RESOLVED | Noted: 2024-02-14 | Resolved: 2024-04-15

## 2024-04-15 PROBLEM — M25.519 PAIN IN UNSPECIFIED SHOULDER: Status: RESOLVED | Noted: 2023-02-24 | Resolved: 2024-04-15

## 2024-04-15 PROBLEM — S09.90XA CLOSED HEAD INJURY: Status: RESOLVED | Noted: 2024-03-25 | Resolved: 2024-04-15

## 2024-04-15 PROBLEM — S09.90XA INJURY OF HEAD: Status: RESOLVED | Noted: 2024-02-14 | Resolved: 2024-04-15
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
## 2024-04-15 PROBLEM — M79.641 RIGHT HAND PAIN: Status: RESOLVED | Noted: 2024-01-22 | Resolved: 2024-04-15

## 2024-04-15 PROBLEM — B99.9 INFECTION: Status: RESOLVED | Noted: 2024-02-14 | Resolved: 2024-04-15

## 2024-04-15 PROBLEM — Z86.73 HISTORY OF STROKE: Status: ACTIVE | Noted: 2024-04-15

## 2024-04-15 PROBLEM — E87.6 HYPOKALEMIA: Status: RESOLVED | Noted: 2024-02-14 | Resolved: 2024-04-15

## 2024-04-15 PROBLEM — M19.90 OA (OSTEOARTHRITIS): Status: ACTIVE | Noted: 2024-02-14

## 2024-04-15 PROBLEM — I63.9 ISCHEMIC CEREBROVASCULAR ACCIDENT (CVA) (MULTI): Status: RESOLVED | Noted: 2024-02-14 | Resolved: 2024-04-15

## 2024-04-15 PROBLEM — R42 DIZZINESS AND GIDDINESS: Status: RESOLVED | Noted: 2024-02-14 | Resolved: 2024-04-15

## 2024-04-15 PROBLEM — S16.1XXA STRAIN OF NECK MUSCLE: Status: RESOLVED | Noted: 2024-02-14 | Resolved: 2024-04-15

## 2024-04-15 PROBLEM — N39.0 ACUTE LOWER URINARY TRACT INFECTION: Status: RESOLVED | Noted: 2024-02-14 | Resolved: 2024-04-15

## 2024-04-15 PROBLEM — S62.514A NONDISPLACED FRACTURE OF PROXIMAL PHALANX OF RIGHT THUMB, INITIAL ENCOUNTER FOR CLOSED FRACTURE: Status: RESOLVED | Noted: 2024-01-22 | Resolved: 2024-04-15

## 2024-04-15 PROBLEM — E83.52 HYPERCALCEMIA: Status: RESOLVED | Noted: 2023-04-10 | Resolved: 2024-04-15

## 2024-04-15 PROBLEM — K57.90 DIVERTICULOSIS: Status: ACTIVE | Noted: 2024-04-15

## 2024-04-15 PROBLEM — R30.0 DYSURIA: Status: RESOLVED | Noted: 2023-10-11 | Resolved: 2024-04-15

## 2024-04-15 PROBLEM — R60.0 EDEMA OF LOWER EXTREMITY: Status: RESOLVED | Noted: 2024-02-14 | Resolved: 2024-04-15

## 2024-04-15 PROBLEM — K57.32 DIVERTICULITIS OF COLON: Status: RESOLVED | Noted: 2024-02-14 | Resolved: 2024-04-15
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
## 2024-04-15 LAB
AMPHETAMINES UR QL SCN: NORMAL
BARBITURATES UR QL SCN: NORMAL
BZE UR QL SCN: NORMAL
CANNABINOIDS UR QL SCN: NORMAL
CREAT UR-MCNC: 55.1 MG/DL (ref 20–320)
PCP UR QL SCN: NORMAL

## 2024-04-15 PROCEDURE — 80368 SEDATIVE HYPNOTICS: CPT

## 2024-04-15 PROCEDURE — G0439 PPPS, SUBSEQ VISIT: HCPCS | Performed by: STUDENT IN AN ORGANIZED HEALTH CARE EDUCATION/TRAINING PROGRAM

## 2024-04-15 PROCEDURE — 1126F AMNT PAIN NOTED NONE PRSNT: CPT | Performed by: STUDENT IN AN ORGANIZED HEALTH CARE EDUCATION/TRAINING PROGRAM

## 2024-04-15 PROCEDURE — 1157F ADVNC CARE PLAN IN RCRD: CPT | Performed by: STUDENT IN AN ORGANIZED HEALTH CARE EDUCATION/TRAINING PROGRAM

## 2024-04-15 PROCEDURE — 99215 OFFICE O/P EST HI 40 MIN: CPT | Performed by: STUDENT IN AN ORGANIZED HEALTH CARE EDUCATION/TRAINING PROGRAM

## 2024-04-15 PROCEDURE — 1170F FXNL STATUS ASSESSED: CPT | Performed by: STUDENT IN AN ORGANIZED HEALTH CARE EDUCATION/TRAINING PROGRAM

## 2024-04-15 PROCEDURE — 80373 DRUG SCREENING TRAMADOL: CPT

## 2024-04-15 PROCEDURE — 80346 BENZODIAZEPINES1-12: CPT

## 2024-04-15 PROCEDURE — 80358 DRUG SCREENING METHADONE: CPT

## 2024-04-15 PROCEDURE — 1123F ACP DISCUSS/DSCN MKR DOCD: CPT | Performed by: STUDENT IN AN ORGANIZED HEALTH CARE EDUCATION/TRAINING PROGRAM

## 2024-04-15 PROCEDURE — 1160F RVW MEDS BY RX/DR IN RCRD: CPT | Performed by: STUDENT IN AN ORGANIZED HEALTH CARE EDUCATION/TRAINING PROGRAM

## 2024-04-15 PROCEDURE — 3078F DIAST BP <80 MM HG: CPT | Performed by: STUDENT IN AN ORGANIZED HEALTH CARE EDUCATION/TRAINING PROGRAM

## 2024-04-15 PROCEDURE — 99214 OFFICE O/P EST MOD 30 MIN: CPT | Performed by: STUDENT IN AN ORGANIZED HEALTH CARE EDUCATION/TRAINING PROGRAM

## 2024-04-15 PROCEDURE — 80361 OPIATES 1 OR MORE: CPT

## 2024-04-15 PROCEDURE — 82570 ASSAY OF URINE CREATININE: CPT

## 2024-04-15 PROCEDURE — 1159F MED LIST DOCD IN RCRD: CPT | Performed by: STUDENT IN AN ORGANIZED HEALTH CARE EDUCATION/TRAINING PROGRAM

## 2024-04-15 PROCEDURE — 1158F ADVNC CARE PLAN TLK DOCD: CPT | Performed by: STUDENT IN AN ORGANIZED HEALTH CARE EDUCATION/TRAINING PROGRAM

## 2024-04-15 PROCEDURE — 1036F TOBACCO NON-USER: CPT | Performed by: STUDENT IN AN ORGANIZED HEALTH CARE EDUCATION/TRAINING PROGRAM

## 2024-04-15 PROCEDURE — 3075F SYST BP GE 130 - 139MM HG: CPT | Performed by: STUDENT IN AN ORGANIZED HEALTH CARE EDUCATION/TRAINING PROGRAM

## 2024-04-15 PROCEDURE — 80365 DRUG SCREENING OXYCODONE: CPT

## 2024-04-15 PROCEDURE — 80307 DRUG TEST PRSMV CHEM ANLYZR: CPT

## 2024-04-15 PROCEDURE — 80354 DRUG SCREENING FENTANYL: CPT

## 2024-04-15 ASSESSMENT — ENCOUNTER SYMPTOMS
HEMATOLOGIC/LYMPHATIC NEGATIVE: 1
MUSCULOSKELETAL NEGATIVE: 1
DEPRESSION: 0
CARDIOVASCULAR NEGATIVE: 1
RESPIRATORY NEGATIVE: 1
EYES NEGATIVE: 1
ALLERGIC/IMMUNOLOGIC NEGATIVE: 1
ENDOCRINE NEGATIVE: 1
DIZZINESS: 1
OCCASIONAL FEELINGS OF UNSTEADINESS: 1
LOSS OF SENSATION IN FEET: 0
GASTROINTESTINAL NEGATIVE: 1
PSYCHIATRIC NEGATIVE: 1
CONSTITUTIONAL NEGATIVE: 1

## 2024-04-15 ASSESSMENT — ACTIVITIES OF DAILY LIVING (ADL)
TAKING_MEDICATION: INDEPENDENT
GROCERY_SHOPPING: INDEPENDENT
DOING_HOUSEWORK: INDEPENDENT
DRESSING: INDEPENDENT
BATHING: INDEPENDENT
MANAGING_FINANCES: INDEPENDENT

## 2024-04-15 ASSESSMENT — PAIN SCALES - GENERAL: PAINLEVEL: 0-NO PAIN

## 2024-04-15 NOTE — PROGRESS NOTES
[facility]: [facility]  MEDICARE WELLNESS EXAM      Claudine Jones is a 89 y.o. female that is presenting today for Annual Exam.    Assessment/Plan    Diagnoses and all orders for this visit:  Annual physical exam  -     Follow Up In Primary Care; Future  Counseling regarding advanced care planning and goals of care  Encounter for routine laboratory testing  -     Opiate/Opioid/Benzo Prescription Compliance; Future  Encounter for immunization  Benign paroxysmal positional vertigo due to bilateral vestibular disorder  -     Referral to Physical Therapy; Future  Gastroesophageal reflux disease without esophagitis  Primary osteoarthritis involving multiple joints  Anemia, unspecified type  Cerebrovascular disease  Mixed hyperlipidemia  Primary hypertension  Vitamin D deficiency  Polypharmacy  -     Opiate/Opioid/Benzo Prescription Compliance; Future  Prediabetes  Anxiety    Discussed routine and/or preventative care with the patient as outlined below:  - Labwork:   - Patient had labwork done for this appointment. Discussed today.   - Everything looked great.  - Will order labwork for the patient to get one week prior to the next appointment.  - Imaging:   - Patient does not appear to be due for routine imaging at this time.  - Immunizations:   - Encouraged the patient to get their shingles (shingrix) immunizations.  - Discussed seasonal immunizations, including the influenza and COVID-19 immunizations.   - Encouraged the patient to confirm that Living Will and Healthcare Power of  (HCPoA) are accurate and up to date.  - Significant medication and problem list reconciliation done today. Discussed any opiate and/or controlled substance use with the patient.  - Patient noting that she takes four meclizine / day right now (2 in the morning, 2 in the evening). Patient states that she takes this because every time she looks down, then she gets dizzy. On further questioning, it sounds like  the patient is actually just getting positional orthostasis. I did do her orthostatics in the office today without that significant of a change, but the patient did note that she felt a little dizzy when she first stood up.  - Discussed with the patient the new hospital policy regarding controlled substances (in this case, benzodiazepines). Discussed with the patient that they would need to sign agreement(s) as well as what these agreement(s) would entail (including routine three month appointments as well as minimum of once/year urine drug screening. Patient agreeable to signing the agreement(s) per our discussion. Agreement(s) signed electronically, and the patient will be provided a paper copy as well as have a copy of the agreement(s) available on [employer]. Discussed with the patient the adverse reactions associated with these medications. Patient voiced understanding. PDMP reviewed and appropriate.   - Vitals acceptable. No changes at this time.    ADVANCED CARE PLANNING  Advanced Care Planning was discussed with patient:  The patient has an active advanced care plan on file. The patient has an active surrogate decision-maker on file.  Encouraged the patient to confirm that Living Will and Healthcare Power of  (HCPoA) are accurate and up to date.  Encouraged the patient to confirm that our office be provided a copy of any documentation in the event that anything changes.    ACTIVITIES OF DAILY LIVING  Basic ADLs:  Bathing: Independent, Dressing: Independent, Toileting: Independent, Transferring: Independent, Continence: Independent, Feeding: Independent.    Instrumental ADLs:  Ability to use phone: Independent, Shopping: Independent, Cooking: Independent, House-keeping: Independent, Laundry: Independent, Transportation: Independent, Medication Management: Independent, Finance Management: Independent.    Subjective   - The patient otherwise feels well and denies any acute symptoms or concerns at this  time.  - The patient denies any changes or progression of their chronic medical problems.  - The patient denies any problems or concerns with their medications.      Review of Systems   Constitutional: Negative.    HENT: Negative.     Eyes: Negative.    Respiratory: Negative.     Cardiovascular: Negative.    Gastrointestinal: Negative.    Endocrine: Negative.    Genitourinary: Negative.    Musculoskeletal: Negative.    Skin: Negative.    Allergic/Immunologic: Negative.    Neurological:  Positive for dizziness.   Hematological: Negative.    Psychiatric/Behavioral: Negative.     All other systems reviewed and are negative.    Objective   Vitals:    04/15/24 1137   BP: 138/60   Pulse: 65   Temp: 36.3 °C (97.3 °F)   SpO2: 98%      Body mass index is 24.33 kg/m².  Physical Exam  Vitals and nursing note reviewed.   Constitutional:       General: She is not in acute distress.     Appearance: Normal appearance. She is not ill-appearing.   HENT:      Head: Normocephalic and atraumatic.      Right Ear: Tympanic membrane, ear canal and external ear normal. There is no impacted cerumen.      Left Ear: Tympanic membrane, ear canal and external ear normal. There is no impacted cerumen.      Nose: Nose normal.      Mouth/Throat:      Mouth: Mucous membranes are moist.      Pharynx: Oropharynx is clear. No oropharyngeal exudate or posterior oropharyngeal erythema.   Eyes:      General: No scleral icterus.        Right eye: No discharge.         Left eye: No discharge.      Extraocular Movements: Extraocular movements intact.      Conjunctiva/sclera: Conjunctivae normal.      Pupils: Pupils are equal, round, and reactive to light.   Neck:      Vascular: No carotid bruit.   Cardiovascular:      Rate and Rhythm: Normal rate and regular rhythm.      Pulses: Normal pulses.      Heart sounds: Normal heart sounds. No murmur heard.     No friction rub. No gallop.   Pulmonary:      Effort: Pulmonary effort is normal. No respiratory  "distress.      Breath sounds: Normal breath sounds.   Abdominal:      General: Abdomen is flat. Bowel sounds are normal. There is no distension.      Palpations: Abdomen is soft.      Tenderness: There is no abdominal tenderness.      Hernia: No hernia is present.   Musculoskeletal:         General: No swelling or tenderness. Normal range of motion.   Lymphadenopathy:      Cervical: No cervical adenopathy.   Skin:     General: Skin is warm and dry.      Capillary Refill: Capillary refill takes less than 2 seconds.      Coloration: Skin is not jaundiced.      Findings: No rash.   Neurological:      General: No focal deficit present.      Mental Status: She is alert and oriented to person, place, and time. Mental status is at baseline.   Psychiatric:         Mood and Affect: Mood normal.         Behavior: Behavior normal.       Diagnostic Results   Lab Results   Component Value Date    GLUCOSE 108 (H) 04/06/2024    CALCIUM 9.7 04/06/2024     04/06/2024    K 5.0 04/06/2024    CO2 27 04/06/2024     04/06/2024    BUN 21 04/06/2024    CREATININE 0.80 04/06/2024     Lab Results   Component Value Date    ALT 12 04/06/2024    AST 18 04/06/2024    ALKPHOS 75 04/06/2024    BILITOT 0.2 04/06/2024     Lab Results   Component Value Date    WBC 7.1 04/06/2024    HGB 13.2 04/06/2024    HCT 40.1 04/06/2024    MCV 91 04/06/2024     04/06/2024     Lab Results   Component Value Date    CHOL 115 (L) 04/04/2023    CHOL 145 04/28/2022    CHOL 136 11/26/2021     Lab Results   Component Value Date    HDL 59 04/04/2023    HDL 56 04/28/2022    HDL 54 11/26/2021     Lab Results   Component Value Date    LDLCALC 41 (L) 04/04/2023    LDLCALC 68 04/28/2022    LDLCALC 62 (L) 11/26/2021     Lab Results   Component Value Date    TRIG 73 04/04/2023    TRIG 107 04/28/2022    TRIG 99 11/26/2021     No components found for: \"CHOLHDL\"  Lab Results   Component Value Date    HGBA1C 5.7 04/04/2023     Other labs not included in the list " above reviewed either before or during this encounter.    History   Past Medical History:   Diagnosis Date    Arthritis     GERD (gastroesophageal reflux disease)     Hand fracture 01/17/2024    right thumb fracture    High blood pressure     High cholesterol      Past Surgical History:   Procedure Laterality Date    MR NECK ANGIO WO IV CONTRAST  10/17/2019    MR NECK ANGIO WO IV CONTRAST [facility]    TOTAL KNEE ARTHROPLASTY Bilateral      No family history on file.  Social History     Socioeconomic History    Marital status:      Spouse name: Not on file    Number of children: Not on file    Years of education: Not on file    Highest education level: Not on file   Occupational History    Not on file   Tobacco Use    Smoking status: Never    Smokeless tobacco: Never   Vaping Use    Vaping status: Never Used   Substance and Sexual Activity    Alcohol use: Yes     Alcohol/week: 7.0 standard drinks of alcohol     Types: 7 Glasses of wine per week    Drug use: Never    Sexual activity: Defer   Other Topics Concern    Not on file   Social History Narrative    ** Merged History Encounter **         ** Merged History Encounter **          Social Determinants of Health     Financial Resource Strain: Not on file   Food Insecurity: Not on file   Transportation Needs: Not on file   Physical Activity: Not on file   Stress: Not on file   Social Connections: Not on file   Intimate Partner Violence: Not on file   Housing Stability: Not on file     No Known Allergies  Current Outpatient Medications on File Prior to Visit   Medication Sig Dispense Refill    acetaminophen (Tylenol 8 HOUR) 650 mg ER tablet Take 1 tablet (650 mg) by mouth every 8 hours if needed for mild pain (1 - 3). Do not crush, chew, or split.      amLODIPine (Norvasc) 5 mg tablet Take 1 tablet (5 mg) by mouth once daily.      aspirin 81 mg EC tablet Take by mouth once daily.      atorvastatin (Lipitor) 20 mg tablet Take 1 tablet (20 mg) by mouth  once daily.      cholecalciferol (Vitamin D3) 50 mcg (2,000 unit) capsule Take 1 capsule (50 mcg) by mouth once daily.      cranberry 500 mg capsule Take by mouth once daily.      cyanocobalamin (Vitamin B-12) 1,000 mcg tablet once every 24 hours.      famotidine (Pepcid) 20 mg tablet Take by mouth once every 24 hours.      ferrous sulfate 325 (65 Fe) MG tablet Take 1 tablet (65 mg of iron) by mouth once daily with a meal.      fluconazole (Diflucan) 150 mg tablet       furosemide (Lasix) 40 mg tablet Take 1 tablet (40 mg) by mouth 2 times a day.      lisinopril 20 mg tablet TAKE 1 TABLET(20 MG) BY MOUTH TWICE DAILY 180 tablet 3    loratadine (Claritin) 10 mg tablet Take by mouth once every 24 hours.      LORazepam (Ativan) 1 mg tablet Take 1 tablet (1 mg) by mouth 2 times a day as needed for anxiety. 30 tablet 3    magnesium oxide 400 mg magnesium capsule Take by mouth once daily.      meclizine (Antivert) 25 mg tablet TAKE 1 TABLET BY MOUTH EVERY 8 HOURS AS NEEDED 270 tablet 3    melatonin 10 mg tablet,disintegrating Take 10 mg by mouth once daily.      multivit-min/ferrous fumarate (MULTI VITAMIN ORAL) Take by mouth once every 24 hours.      omega-3 (Fish Oil) 60- mg capsule Take 1,200 mg by mouth once daily.      [DISCONTINUED] UNABLE TO FIND Handicapped Parking Placard (5 yr) 5 Years as directed for 5 years 30 Dec, 2019 Not-Taking/PRN      [DISCONTINUED] amoxicillin (Amoxil) 500 mg capsule       [DISCONTINUED] calcium carbonate 600 mg calcium (1,500 mg) tablet Take 600 mg by mouth once daily.      [DISCONTINUED] fluticasone (Flonase) 50 mcg/actuation nasal spray Administer 1 spray into each nostril once daily. shake liquid      [DISCONTINUED] lidocaine 4 % patch Apply topically.      [DISCONTINUED] naproxen sodium (Aleve) 220 mg tablet Take by mouth every 12 hours.       No current facility-administered medications on file prior to visit.     Immunization History   Administered Date(s) Administered     Moderna COVID-19 vaccine, bivalent, blue cap/gray label *Check age/dose* 11/22/2022    Moderna SARS-CoV-2 Vaccination 02/05/2021, 03/05/2021, 10/22/2021, 04/06/2022    Pneumococcal conjugate vaccine, 13-valent (PREVNAR 13) 02/28/2017    Pneumococcal polysaccharide vaccine, 23-valent, age 2 years and older (PNEUMOVAX 23) 12/28/2018    Tdap vaccine, age 7 year and older (BOOSTRIX, ADACEL) 01/17/2024     Patient's medical history was reviewed and updated either before or during this encounter.     Cash Palencia MD

## 2024-04-15 NOTE — PATIENT INSTRUCTIONS
Discussed routine and/or preventative care with the patient as outlined below:  - Labwork:   - Patient had labwork done for this appointment. Discussed today.   - Everything looked great.  - Will order labwork for the patient to get one week prior to the next appointment.  - Imaging:   - Patient does not appear to be due for routine imaging at this time.  - Immunizations:   - Encouraged the patient to get their shingles (shingrix) immunizations.  - Discussed seasonal immunizations, including the influenza and COVID-19 immunizations.   - Encouraged the patient to confirm that Living Will and Healthcare Power of  (HCPoA) are accurate and up to date.  - Significant medication and problem list reconciliation done today. Discussed any opiate and/or controlled substance use with the patient.  - Patient noting that she takes four meclizine / day right now (2 in the morning, 2 in the evening). Patient states that she takes this because every time she looks down, then she gets dizzy. On further questioning, it sounds like the patient is actually just getting positional orthostasis. I did do her orthostatics in the office today without that significant of a change, but the patient did note that she felt a little dizzy when she first stood up.  - Discussed with the patient the new hospital policy regarding controlled substances (in this case, benzodiazepines). Discussed with the patient that they would need to sign agreement(s) as well as what these agreement(s) would entail (including routine three month appointments as well as minimum of once/year urine drug screening. Patient agreeable to signing the agreement(s) per our discussion. Agreement(s) signed electronically, and the patient will be provided a paper copy as well as have a copy of the agreement(s) available on [email or web address]. Discussed with the patient the adverse reactions associated with these medications. Patient voiced understanding. PDMP reviewed and  appropriate.   - Vitals acceptable. No changes at this time.

## 2024-04-18 LAB
1OH-MIDAZOLAM UR CFM-MCNC: <25 NG/ML
6MAM UR CFM-MCNC: <25 NG/ML
7AMINOCLONAZEPAM UR CFM-MCNC: <25 NG/ML
A-OH ALPRAZ UR CFM-MCNC: <25 NG/ML
ALPRAZ UR CFM-MCNC: <25 NG/ML
CHLORDIAZEP UR CFM-MCNC: <25 NG/ML
CLONAZEPAM UR CFM-MCNC: <25 NG/ML
CODEINE UR CFM-MCNC: <50 NG/ML
DIAZEPAM UR CFM-MCNC: <25 NG/ML
EDDP UR CFM-MCNC: <25 NG/ML
FENTANYL UR CFM-MCNC: <2.5 NG/ML
HYDROCODONE CTO UR CFM-MCNC: <25 NG/ML
HYDROMORPHONE UR CFM-MCNC: <25 NG/ML
LORAZEPAM UR CFM-MCNC: 124 NG/ML
METHADONE UR CFM-MCNC: <25 NG/ML
MIDAZOLAM UR CFM-MCNC: <25 NG/ML
MORPHINE UR CFM-MCNC: <50 NG/ML
NORDIAZEPAM UR CFM-MCNC: <25 NG/ML
NORFENTANYL UR CFM-MCNC: <2.5 NG/ML
NORHYDROCODONE UR CFM-MCNC: <25 NG/ML
NOROXYCODONE UR CFM-MCNC: <25 NG/ML
NORTRAMADOL UR-MCNC: <50 NG/ML
OXAZEPAM UR CFM-MCNC: <25 NG/ML
OXYCODONE UR CFM-MCNC: <25 NG/ML
OXYMORPHONE UR CFM-MCNC: <25 NG/ML
TEMAZEPAM UR CFM-MCNC: <25 NG/ML
TRAMADOL UR CFM-MCNC: <50 NG/ML
ZOLPIDEM UR CFM-MCNC: <25 NG/ML
ZOLPIDEM UR-MCNC: <25 NG/ML

## 2024-05-24 ENCOUNTER — CLINICAL SUPPORT (OUTPATIENT)
Dept: PHYSICAL THERAPY | Facility: CLINIC | Age: 89
End: 2024-05-24
Payer: MEDICARE

## 2024-05-24 DIAGNOSIS — H81.13 BENIGN PAROXYSMAL POSITIONAL VERTIGO DUE TO BILATERAL VESTIBULAR DISORDER: ICD-10-CM

## 2024-05-24 PROCEDURE — 97162 PT EVAL MOD COMPLEX 30 MIN: CPT | Mod: GP

## 2024-05-24 ASSESSMENT — PAIN SCALES - GENERAL: PAINLEVEL_OUTOF10: 0 - NO PAIN

## 2024-05-24 ASSESSMENT — PAIN - FUNCTIONAL ASSESSMENT: PAIN_FUNCTIONAL_ASSESSMENT: 0-10

## 2024-05-24 NOTE — PROGRESS NOTES
"    Physical Therapy Evaluation    Patient Name: Claudine Jones  MRN: [id number]  Evaluation Date: 5/24/2024  Time Calculation  Start Time: 1015  Stop Time: 1100  Time Calculation (min): 45 min  PT Evaluation Time Entry  PT Evaluation (Moderate) Time Entry: 45          Problem List Items Addressed This Visit             ICD-10-CM    BPPV (benign paroxysmal positional vertigo) H81.10         Subjective   General:  General  Reason for Referral: Dizziness, B/L vestibular dysfunction, BPPV  Referred By: Dr. Cash Palencia  Past Medical History Relevant to Rehab: 89 y/o F with h/o recent dizziness. Denies falls or trauma.  Denies other neurological symptoms. Dizzy with cervical flexion/extension an bending down, getting up.  All dizzy with most transfers including bed mobility but denies overall vertiginous symptoms or \"room spinning\".  Denies HA or other visual changes.  No other work up other than PCP visit at this time.  No ENT work up.  Patient presents with POA who is a friend.  Family/Caregiver Present: Yes  Caregiver Feedback: Agreeable to POC    Patient reported hx of condition: H/O dizziness     Precautions:   Falls, dizziness considerations    Relevant PMH:  H/O dizziness, HTN, anemia, UTI, broken R hand    Red flags: No signs of vertebral/basilar artery involvement, negative for vertical nystagmus    Pain:  Pain Assessment: 0-10  Pain Score: 0 - No pain  Home Living:  Home Living Comment: 0    Prior Function Per Pt/Caregiver Report:  Level of [place]: Independent with ADLs and functional transfers, Independent with homemaking with ambulation  Ambulatory Assistance: Independent    OBJECTIVE:  Objective   Posture:  Posture Comment: WNL.  Gross cervical posture intact with no contraindications identified for repositioning maneuver  Range of Motion:   WFL  Strength:   WFL  Flexibility:   N/A  Palpation:   N/A  Special Tests:  Special Tests Comment: + L to R upbeat torsional nystagmus. A geotrophhic lying " in supine with head turn R but difficult to assess; patient closing eyes due to dizziness.  Likely posterior canal with possible horizontal/lateral canal conversion.  Denies other visual symptoms.  Extra ocular tracking intact.  No field cut or vision loss.   Gait:  Gait Comment: No  Balance:  Balance Comment: WFL  Stairs:   N/A  Bed Mobility:  Bed Mobility Comment: Independent  Transfers:  Transfers Comment: Independent  Other:  Comment: Denies nausea.  Trialed repositioning with long sit to supine and head rotation to R. Patient unable to tolerate this date due to c/o severe dizziness. Resolved in < 30 seconds once returned to sitting position.    Outcome Measures:  Other Measures  Activities - Specific Balance Confidence Scale: 75% confidence     Assessment  PT Assessment Results: Decreased endurance, Impaired balance, Impaired vision, Impaired judgement (Dizziness)  Rehab Prognosis: Good  Evaluation/Treatment Tolerance: Other (Comment) (Limited by dizziness)    Pt is a 89 y.o. female who presents with impairments of dizziness. These impairments have led to functional limitations including impaired balance, endurance and mobility safety. Pt would benefit from skilled physical therapy intervention to improve above impairments and facilitate return to function.    Plan  Treatment/Interventions: Canalith repositioning, Therapeutic exercises, Therapeutic activities, Neuromuscular re-education  PT Plan: Skilled PT  PT Frequency: 1 time per week  Duration: 6-8 weeks  Onset Date: 05/24/24  Certification Period Start Date: 05/24/24  Number of Treatments Authorized: Requires auth  Rehab Potential: Good  Plan of Care Agreement: Patient    Plan for next visit: Re-assess nystagmus, focus on posterior canal repositioning    OP EDUCATION:  Outpatient Education  Individual(s) Educated: Patient, Caregiver  Education Provided: POC  Diagnosis and Precautions: B/L vestibular hypofunction, posterior canal BPPV  Risk and Benefits  Discussed with Patient/Caregiver/Other: yes  Patient/Caregiver Demonstrated Understanding: yes  Plan of Care Discussed and Agreed Upon: yes  Patient Response to Education: Patient/Caregiver Verbalized Understanding of Information  Education Comment: Educated patient and POA in progression of canalith repositioning.  Discussed how treatment may increased dizziness before resolving and complete resolution may not occur. Patient and POA agreeable to proceed    Today's Treatment:  No treatment billed today as pt requires prior authorization  HEP to be completed daily, exercises include:  To be provided Epley's once tolerance improves    Goals:  Active       Outpatient PT goals       Short Term Goals       Start:  05/24/24    Expected End:  07/08/24       *Short Term Goals established x 6 weeks (45 days)    STG 1: Patient will complete modified Epley's/Canalith with supervision from friend/POA IND as needed x 3 visits  STG 2: Patient will have no dizziness with bed mobility lying <-> sitting   STG 3: Patient will have no dizziness rolling over in bed.   STG 4: Patient will report 50% improvement in overall dizziness symptoms.

## 2024-06-05 ENCOUNTER — TELEMEDICINE (OUTPATIENT)
Dept: PRIMARY CARE | Facility: CLINIC | Age: 89
End: 2024-06-05
Payer: MEDICARE

## 2024-06-05 VITALS — WEIGHT: 133 LBS | BODY MASS INDEX: 24.33 KG/M2

## 2024-06-05 DIAGNOSIS — E78.2 MIXED HYPERLIPIDEMIA: ICD-10-CM

## 2024-06-05 DIAGNOSIS — Z79.899 POLYPHARMACY: Primary | ICD-10-CM

## 2024-06-05 DIAGNOSIS — E53.8 VITAMIN B12 DEFICIENCY: ICD-10-CM

## 2024-06-05 DIAGNOSIS — K21.9 GASTROESOPHAGEAL REFLUX DISEASE WITHOUT ESOPHAGITIS: ICD-10-CM

## 2024-06-05 DIAGNOSIS — I10 PRIMARY HYPERTENSION: ICD-10-CM

## 2024-06-05 DIAGNOSIS — E55.9 VITAMIN D DEFICIENCY: ICD-10-CM

## 2024-06-05 DIAGNOSIS — F41.9 ANXIETY: ICD-10-CM

## 2024-06-05 DIAGNOSIS — I67.9 CEREBROVASCULAR DISEASE: ICD-10-CM

## 2024-06-05 DIAGNOSIS — K58.9 IRRITABLE BOWEL SYNDROME WITHOUT DIARRHEA: ICD-10-CM

## 2024-06-05 DIAGNOSIS — J30.2 SEASONAL ALLERGIES: ICD-10-CM

## 2024-06-05 PROCEDURE — 1036F TOBACCO NON-USER: CPT | Performed by: STUDENT IN AN ORGANIZED HEALTH CARE EDUCATION/TRAINING PROGRAM

## 2024-06-05 PROCEDURE — 99441 PR PHYS/QHP TELEPHONE EVALUATION 5-10 MIN: CPT | Performed by: STUDENT IN AN ORGANIZED HEALTH CARE EDUCATION/TRAINING PROGRAM

## 2024-06-05 PROCEDURE — 1126F AMNT PAIN NOTED NONE PRSNT: CPT | Performed by: STUDENT IN AN ORGANIZED HEALTH CARE EDUCATION/TRAINING PROGRAM

## 2024-06-05 PROCEDURE — 1157F ADVNC CARE PLAN IN RCRD: CPT | Performed by: STUDENT IN AN ORGANIZED HEALTH CARE EDUCATION/TRAINING PROGRAM

## 2024-06-05 PROCEDURE — 1159F MED LIST DOCD IN RCRD: CPT | Performed by: STUDENT IN AN ORGANIZED HEALTH CARE EDUCATION/TRAINING PROGRAM

## 2024-06-05 RX ORDER — LOPERAMIDE HYDROCHLORIDE AND SIMETHICONE 2; 125 MG/1; MG/1
1 TABLET ORAL
COMMUNITY
End: 2024-06-05 | Stop reason: DRUGHIGH

## 2024-06-05 RX ORDER — LORATADINE 10 MG/1
10 TABLET ORAL DAILY PRN
Status: SHIPPED
Start: 2024-06-05 | End: 2024-06-05 | Stop reason: DRUGHIGH

## 2024-06-05 RX ORDER — ATORVASTATIN CALCIUM 20 MG/1
20 TABLET, FILM COATED ORAL DAILY
Qty: 90 TABLET | Refills: 3 | Status: SHIPPED | OUTPATIENT
Start: 2024-06-05 | End: 2025-06-05

## 2024-06-05 RX ORDER — MULTIVITAMIN
1 TABLET ORAL DAILY
COMMUNITY
Start: 2024-06-05

## 2024-06-05 RX ORDER — ASPIRIN 81 MG/1
81 TABLET ORAL DAILY
Status: SHIPPED | COMMUNITY
Start: 2024-06-05

## 2024-06-05 RX ORDER — ASPIRIN 325 MG
1000 TABLET, DELAYED RELEASE (ENTERIC COATED) ORAL DAILY
Status: SHIPPED | COMMUNITY
Start: 2024-06-05

## 2024-06-05 RX ORDER — LISINOPRIL 20 MG/1
20 TABLET ORAL DAILY
Qty: 90 TABLET | Refills: 3 | Status: SHIPPED | OUTPATIENT
Start: 2024-06-05 | End: 2025-06-05

## 2024-06-05 RX ORDER — LISINOPRIL 20 MG/1
20 TABLET ORAL DAILY
Status: SHIPPED
Start: 2024-06-05 | End: 2024-06-05 | Stop reason: SDUPTHER

## 2024-06-05 RX ORDER — LANOLIN ALCOHOL/MO/W.PET/CERES
1000 CREAM (GRAM) TOPICAL DAILY
Status: SHIPPED
Start: 2024-06-05 | End: 2024-06-05 | Stop reason: ALTCHOICE

## 2024-06-05 RX ORDER — FAMOTIDINE 20 MG/1
20 TABLET, FILM COATED ORAL NIGHTLY PRN
Status: SHIPPED
Start: 2024-06-05

## 2024-06-05 RX ORDER — LORAZEPAM 1 MG/1
1 TABLET ORAL 2 TIMES DAILY PRN
Status: SHIPPED
Start: 2024-06-05

## 2024-06-05 RX ORDER — LOPERAMIDE HYDROCHLORIDE AND SIMETHICONE 2; 125 MG/1; MG/1
1 TABLET ORAL
Status: SHIPPED | COMMUNITY
Start: 2024-06-05

## 2024-06-05 RX ORDER — LORATADINE 10 MG/1
10 TABLET ORAL DAILY PRN
Status: SHIPPED | COMMUNITY
Start: 2024-06-05

## 2024-06-05 ASSESSMENT — ENCOUNTER SYMPTOMS
CARDIOVASCULAR NEGATIVE: 1
CONSTITUTIONAL NEGATIVE: 1
RESPIRATORY NEGATIVE: 1
GASTROINTESTINAL NEGATIVE: 1

## 2024-06-05 ASSESSMENT — PATIENT HEALTH QUESTIONNAIRE - PHQ9
1. LITTLE INTEREST OR PLEASURE IN DOING THINGS: NOT AT ALL
2. FEELING DOWN, DEPRESSED OR HOPELESS: NOT AT ALL
SUM OF ALL RESPONSES TO PHQ9 QUESTIONS 1 AND 2: 0

## 2024-06-05 ASSESSMENT — PAIN SCALES - GENERAL: PAINLEVEL: 0-NO PAIN

## 2024-06-05 NOTE — PROGRESS NOTES
[facility]: [facility]  TELEHEALTH ENCOUNTER      Claudine Jones is a 89 y.o. female that is presenting today for Follow-up (Med managment).  This is a telehealth encounter with audio technology. Patient has consented to this type of visit. Duration of encounter: 5 minutes.    Assessment/Plan   Diagnoses and all orders for this visit:  Polypharmacy  -     LORazepam (Ativan) 1 mg tablet; Take 1 tablet (1 mg) by mouth 2 times a day as needed for anxiety.  Gastroesophageal reflux disease without esophagitis  -     famotidine (Pepcid) 20 mg tablet; Take 1 tablet (20 mg) by mouth as needed at bedtime for heartburn or indigestion.  Irritable bowel syndrome without diarrhea  -     loperamide-simethicone 2-125 mg tablet per tablet; Take 1 capsule by mouth 2 times daily (morning and midday).  Cerebrovascular disease  -     aspirin 81 mg EC tablet; Take 1 tablet (81 mg) by mouth once daily.  Vitamin B12 deficiency  -     multivitamin tablet; Take 1 tablet by mouth once daily.  Mixed hyperlipidemia  -     atorvastatin (Lipitor) 20 mg tablet; Take 1 tablet (20 mg) by mouth once daily.  -     omega-3 (Fish Oil) 60- mg capsule; Take 2 capsules (1,000 mg) by mouth once daily.  Primary hypertension  -     lisinopril 20 mg tablet; Take 1 tablet (20 mg) by mouth once daily.  Vitamin D deficiency  -     multivitamin tablet; Take 1 tablet by mouth once daily.  Seasonal allergies  -     loratadine (Claritin) 10 mg tablet; Take 1 tablet (10 mg) by mouth once daily as needed for allergies.  Anxiety  -     LORazepam (Ativan) 1 mg tablet; Take 1 tablet (1 mg) by mouth 2 times a day as needed for anxiety.    - Patient's friend, Millie, requested that this appointment be scheduled on the patient's behalf. Millie manages the patient's medications for her since our last appointment and wanted to update us on how the patient is taking her medications.   - Following discussion with Millie, medication list is updated  "to reflect what the patient was doing AND what the patient needs to be doing.    Subjective   - The patient otherwise feels well and denies any acute symptoms or concerns at this time.  - The patient denies any changes or progression of their chronic medical problems.  - The patient denies any problems or concerns with their medications.      Review of Systems   Constitutional: Negative.    Respiratory: Negative.     Cardiovascular: Negative.    Gastrointestinal: Negative.    All other systems reviewed and are negative.     Objective   There were no vitals filed for this visit.  Body mass index is 24.33 kg/m².  Physical Exam  Vitals (Patient-reported vitals.) reviewed.   Constitutional:       Comments: This is a virtual / telehealth encounter; unable to perform physical exam.       Diagnostic Results   Lab Results   Component Value Date    GLUCOSE 108 (H) 04/06/2024    CALCIUM 9.7 04/06/2024     04/06/2024    K 5.0 04/06/2024    CO2 27 04/06/2024     04/06/2024    BUN 21 04/06/2024    CREATININE 0.80 04/06/2024     Lab Results   Component Value Date    ALT 12 04/06/2024    AST 18 04/06/2024    ALKPHOS 75 04/06/2024    BILITOT 0.2 04/06/2024     Lab Results   Component Value Date    WBC 7.1 04/06/2024    HGB 13.2 04/06/2024    HCT 40.1 04/06/2024    MCV 91 04/06/2024     04/06/2024     Lab Results   Component Value Date    CHOL 115 (L) 04/04/2023    CHOL 145 04/28/2022    CHOL 136 11/26/2021     Lab Results   Component Value Date    HDL 59 04/04/2023    HDL 56 04/28/2022    HDL 54 11/26/2021     Lab Results   Component Value Date    LDLCALC 41 (L) 04/04/2023    LDLCALC 68 04/28/2022    LDLCALC 62 (L) 11/26/2021     Lab Results   Component Value Date    TRIG 73 04/04/2023    TRIG 107 04/28/2022    TRIG 99 11/26/2021     No components found for: \"CHOLHDL\"  Lab Results   Component Value Date    HGBA1C 5.7 04/04/2023     Other labs not included in the list above were reviewed either before or during " this encounter.    History   Past Medical History:   Diagnosis Date    Arthritis     GERD (gastroesophageal reflux disease)     Hand fracture 01/17/2024    right thumb fracture    High blood pressure     High cholesterol      Past Surgical History:   Procedure Laterality Date    MR NECK ANGIO WO IV CONTRAST  10/17/2019    MR NECK ANGIO WO IV CONTRAST [facility]    TOTAL KNEE ARTHROPLASTY Bilateral      No family history on file.  Social History     Socioeconomic History    Marital status:      Spouse name: Not on file    Number of children: Not on file    Years of education: Not on file    Highest education level: Not on file   Occupational History    Not on file   Tobacco Use    Smoking status: Never     Passive exposure: Never    Smokeless tobacco: Never   Vaping Use    Vaping status: Never Used   Substance and Sexual Activity    Alcohol use: Yes     Alcohol/week: 7.0 standard drinks of alcohol     Types: 7 Glasses of wine per week    Drug use: Never    Sexual activity: Defer   Other Topics Concern    Not on file   Social History Narrative    ** Merged History Encounter **         ** Merged History Encounter **          Social Determinants of Health     Financial Resource Strain: Not on file   Food Insecurity: Not on file   Transportation Needs: Not on file   Physical Activity: Not on file   Stress: Not on file   Social Connections: Not on file   Intimate Partner Violence: Not on file   Housing Stability: Not on file     No Known Allergies  Current Outpatient Medications on File Prior to Visit   Medication Sig Dispense Refill    acetaminophen (Tylenol 8 HOUR) 650 mg ER tablet Take 1 tablet (650 mg) by mouth every 8 hours if needed for mild pain (1 - 3). Do not crush, chew, or split.      melatonin 10 mg tablet,disintegrating Take 10 mg by mouth once daily.      [DISCONTINUED] aspirin 81 mg EC tablet Take by mouth once daily.      [DISCONTINUED] cranberry 500 mg capsule Take by mouth once daily.       [DISCONTINUED] cyanocobalamin (Vitamin B-12) 1,000 mcg tablet once every 24 hours.      [DISCONTINUED] lisinopril 20 mg tablet TAKE 1 TABLET(20 MG) BY MOUTH TWICE DAILY (Patient taking differently: Take 1 tablet (20 mg) by mouth once daily.) 180 tablet 3    [DISCONTINUED] loperamide-simethicone 2-125 mg tablet per tablet Take 1 capsule by mouth 2 times daily (morning and midday).      [DISCONTINUED] loratadine (Claritin) 10 mg tablet Take by mouth once every 24 hours.      [DISCONTINUED] LORazepam (Ativan) 1 mg tablet Take 1 tablet (1 mg) by mouth 2 times a day as needed for anxiety. 30 tablet 3    [DISCONTINUED] multivit-min/ferrous fumarate (MULTI VITAMIN ORAL) Take by mouth once every 24 hours.      [DISCONTINUED] omega-3 (Fish Oil) 60- mg capsule Take 1,200 mg by mouth once daily.      [DISCONTINUED] amLODIPine (Norvasc) 5 mg tablet Take 1 tablet (5 mg) by mouth once daily. (Patient not taking: Reported on 6/5/2024)      [DISCONTINUED] atorvastatin (Lipitor) 20 mg tablet Take 1 tablet (20 mg) by mouth once daily. (Patient not taking: Reported on 6/5/2024)      [DISCONTINUED] cholecalciferol (Vitamin D3) 50 mcg (2,000 unit) capsule Take 1 capsule (50 mcg) by mouth once daily. (Patient not taking: Reported on 6/5/2024)      [DISCONTINUED] famotidine (Pepcid) 20 mg tablet Take by mouth once every 24 hours.      [DISCONTINUED] ferrous sulfate 325 (65 Fe) MG tablet Take 1 tablet (65 mg of iron) by mouth once daily with a meal. (Patient not taking: Reported on 6/5/2024)      [DISCONTINUED] fluconazole (Diflucan) 150 mg tablet       [DISCONTINUED] furosemide (Lasix) 40 mg tablet Take 1 tablet (40 mg) by mouth 2 times a day.      [DISCONTINUED] magnesium oxide 400 mg magnesium capsule Take by mouth once daily.      [DISCONTINUED] meclizine (Antivert) 25 mg tablet TAKE 1 TABLET BY MOUTH EVERY 8 HOURS AS NEEDED (Patient not taking: Reported on 6/5/2024) 270 tablet 3     No current facility-administered  medications on file prior to visit.     Immunization History   Administered Date(s) Administered    Moderna COVID-19 vaccine, bivalent, blue cap/gray label *Check age/dose* 11/22/2022    Moderna SARS-CoV-2 Vaccination 02/05/2021, 03/05/2021, 10/22/2021, 04/06/2022    Pneumococcal conjugate vaccine, 13-valent (PREVNAR 13) 02/28/2017    Pneumococcal polysaccharide vaccine, 23-valent, age 2 years and older (PNEUMOVAX 23) 12/28/2018    Tdap vaccine, age 7 year and older (BOOSTRIX, ADACEL) 01/17/2024     Patient's medical history was reviewed and updated either before or during this encounter.       Cash Palencia MD

## 2024-06-20 ENCOUNTER — TREATMENT (OUTPATIENT)
Dept: PHYSICAL THERAPY | Facility: CLINIC | Age: 89
End: 2024-06-20
Payer: MEDICARE

## 2024-06-20 DIAGNOSIS — H81.13 BENIGN PAROXYSMAL POSITIONAL VERTIGO DUE TO BILATERAL VESTIBULAR DISORDER: ICD-10-CM

## 2024-06-20 PROCEDURE — 95992 CANALITH REPOSITIONING PROC: CPT | Mod: GP

## 2024-06-21 NOTE — PROGRESS NOTES
Physical Therapy Treatment    Patient Name: Claudine Jones  MRN: [id number]  Encounter date:  6/20/2024  Time Calculation  Start Time: 0945  Stop Time: 1015  Time Calculation (min): 30 min  PT Modalities Time Entry  Canalith Repositioning Time Entry: 25       Visit Number:  2/5 (including evaluation)  Planned total visits: 5 approved  Visits Authorized/Insurance Coverage:  5 approved    Current Problem  Problem List Items Addressed This Visit             ICD-10-CM    BPPV (benign paroxysmal positional vertigo) H81.10       Precautions   Dizziness, cervical neck pain precautions with repositioning: Caution with aggressive neck rotation and extension    Pain   Denies    Subjective  No longer on Meclizine.  Dizziness better.  Not as bad with transfers.  Still gets dizzy with turning around and rolling over in bed.     Objective  R to L horizontal nystagmus  No vertical nystagmus  No signs of central involvement  Dizziness with rolling to L > R    Treatment:  Epley's maneuver L to R then R to left with 2 min rest in between  Barbecue roll B/L directions with no prone positioning  Education in positioning with technique and importance of holds and neck stability  X 25 minutes    Mild dizziness with turn transitions    No dizziness with supine to sit and sit to supine    No dizziness on exit    Remains with R to L H nystagmus on exit    Current HEP:  BBQ roll with caregiver 5 rounds BID    Activity tolerance:  Good    OP EDUCATION:   Review of treatment, BBQ, epley's    Assessment:   Improving overall symptoms but still getting dizzy.  Symptoms less severe.    Pain end of session: 0    Plan:     Continue with current POC/no changes    Assessment of current progress against goals:  Progressing toward functional goals and Symptomatic relief with treatment    Goals:  Active       Outpatient PT goals       Short Term Goals       Start:  05/24/24    Expected End:  07/08/24       *Short Term Goals established x 6 weeks  (45 days)    STG 1: Patient will complete modified Epley's/Canalith with supervision from friend/POA IND as needed x 3 visits  STG 2: Patient will have no dizziness with bed mobility lying <-> sitting   STG 3: Patient will have no dizziness rolling over in bed.   STG 4: Patient will report 50% improvement in overall dizziness symptoms.

## 2024-06-25 ENCOUNTER — APPOINTMENT (OUTPATIENT)
Dept: PHYSICAL THERAPY | Facility: CLINIC | Age: 89
End: 2024-06-25
Payer: MEDICARE

## 2024-07-02 ENCOUNTER — TREATMENT (OUTPATIENT)
Dept: PHYSICAL THERAPY | Facility: CLINIC | Age: 89
End: 2024-07-02
Payer: MEDICARE

## 2024-07-02 DIAGNOSIS — H81.13 BENIGN PAROXYSMAL POSITIONAL VERTIGO DUE TO BILATERAL VESTIBULAR DISORDER: ICD-10-CM

## 2024-07-02 PROCEDURE — 95992 CANALITH REPOSITIONING PROC: CPT | Mod: GP

## 2024-07-02 NOTE — PROGRESS NOTES
Physical Therapy Treatment     Patient Name: Claudine Jones  MRN: [id number]  Encounter date:  7/2/2024  Time Calculation  Start Time: 1000  Stop Time: 1030  Time Calculation (min):  min  PT Modalities Time Entry  Canalith Repositioning Time Entry: 25     Visit Number:  3/5 (including evaluation)  Planned total visits: 5 approved  Visits Authorized/Insurance Coverage:  5 approved     Current Problem  Problem List Items Addressed This Visit               ICD-10-CM     BPPV (benign paroxysmal positional vertigo) H81.10         Precautions   Dizziness, cervical neck pain precautions with repositioning: Caution with aggressive neck rotation and extension     Pain   Denies     Subjective  Symptoms less prevalent.  Still gets dizzy when getting up in am.    Objective  B/L horizontal nystagmus    Treatment:  Epley's maneuver canalith repositioning L only  Barbecue roll B/L directions with no prone positioning  Trunk extension with neck stabilization x 3 to test for vertical nystagmus and symptoms  Education in positioning with technique and importance of holds and neck stability  X 25 minutes     No dizziness with turn transitions today     No dizziness with supine to sit and sit to supine     No dizziness on exit     Current HEP:  BBQ roll with caregiver 5 rounds BID     Activity tolerance:  Good     OP EDUCATION:   Review of treatment, BBQ, epley's     Assessment: Improving symptoms.  Minimal dizziness.  Patients reports she is more functional with less need to hold on to objects when walking around home.     Pain end of session: 0     Plan:  Continue with current POC/no changes     Assessment of current progress against goals:  Progressing toward functional goals and Symptomatic relief with treatment     Goals:  Active         Outpatient PT goals         Short Term Goals         Start:  05/24/24    Expected End:  07/08/24        *Short Term Goals established x 6 weeks (45 days)     STG 1: Patient will complete  modified Epley's/[place] with supervision from friend/POA IND as needed x 3 visits  STG 2: Patient will have no dizziness with bed mobility lying <-> sitting   STG 3: Patient will have no dizziness rolling over in bed.   STG 4: Patient will report 50% improvement in overall dizziness symptoms.

## 2024-07-09 ENCOUNTER — TREATMENT (OUTPATIENT)
Dept: PHYSICAL THERAPY | Facility: CLINIC | Age: 89
End: 2024-07-09
Payer: MEDICARE

## 2024-07-09 DIAGNOSIS — H81.13 BENIGN PAROXYSMAL POSITIONAL VERTIGO DUE TO BILATERAL VESTIBULAR DISORDER: ICD-10-CM

## 2024-07-09 PROCEDURE — 97530 THERAPEUTIC ACTIVITIES: CPT | Mod: GP

## 2024-07-09 NOTE — PROGRESS NOTES
"        Physical Therapy Treatment/Discharge Summary     Patient Name: Claudine Jones  MRN: [id number]  Encounter date:  7/9/2024  Time Calculation  Start Time: 1210  Stop Time: 1240  Time Calculation (min):  min  Patient education x 30 minutes     Visit Number:  4/5 (including evaluation)  Planned total visits: 5 approved  Visits Authorized/Insurance Coverage:  5 approved     Current Problem  Problem List Items Addressed This Visit               ICD-10-CM     BPPV (benign paroxysmal positional vertigo) H81.10         Precautions   Dizziness, cervical neck pain precautions with repositioning: Caution with aggressive neck rotation and extension     Pain   Denies     Subjective  Reports minimal dizziness.  No dizziness.  Gets mildly \"light headed\" when bending over.  IND with barbeque roll technique for horizontal canal repositioning.  Discussed discharge vs. Continued treatment with patient and with caregiver.  Patient agreeable to discontinue. Feels her dizziness is controlled.  Independent gait with no balls and high confidence with ambulation.      Objective  Negative Epley's B/L today     Treatment:  Education x 30 minutes therapeutic activity:  Discussed continued tx vs discharge  Reviewed postural hypotension vs. Posterior semi-circular canal etiology of being light headed when bending over.   Reviewed pacing when getting out of bed and standing up at home.   Discussed how to obtain new RX if pt. Should need PT in future.     Current HEP:  BBQ roll with caregiver 5 rounds BID     Activity tolerance:  Good     OP EDUCATION:   Review of treatment, BBQ, epley's     Assessment: Goals met    Pain end of session: 0     Plan:  Discontinue      Assessment of current progress against goals:  Progressing toward functional goals and Symptomatic relief with treatment     Goals:  Active         Outpatient PT goals         Short Term Goals         Start:  05/24/24    Expected End:  07/08/24        *Short Term Goals " established x 6 weeks (45 days)     STG 1: Patient will complete modified Epley's/Canalith with supervision from friend/POA IND as needed x 3 visits  STG 2: Patient will have no dizziness with bed mobility lying <-> sitting   STG 3: Patient will have no dizziness rolling over in bed.   STG 4: Patient will report 50% improvement in overall dizziness symptoms.

## 2024-07-10 ENCOUNTER — APPOINTMENT (OUTPATIENT)
Dept: LAB | Facility: LAB | Age: 89
End: 2024-07-10
Payer: MEDICARE

## 2024-07-16 ENCOUNTER — OFFICE VISIT (OUTPATIENT)
Dept: PRIMARY CARE | Facility: CLINIC | Age: 89
End: 2024-07-16
Payer: MEDICARE

## 2024-07-16 VITALS
OXYGEN SATURATION: 98 % | HEART RATE: 62 BPM | SYSTOLIC BLOOD PRESSURE: 132 MMHG | DIASTOLIC BLOOD PRESSURE: 80 MMHG | HEIGHT: 62 IN | WEIGHT: 136 LBS | BODY MASS INDEX: 25.03 KG/M2 | TEMPERATURE: 97.7 F

## 2024-07-16 DIAGNOSIS — D64.9 ANEMIA, UNSPECIFIED TYPE: ICD-10-CM

## 2024-07-16 DIAGNOSIS — Z01.89 ENCOUNTER FOR ROUTINE LABORATORY TESTING: ICD-10-CM

## 2024-07-16 DIAGNOSIS — E53.8 VITAMIN B12 DEFICIENCY: ICD-10-CM

## 2024-07-16 DIAGNOSIS — I67.9 CEREBROVASCULAR DISEASE: ICD-10-CM

## 2024-07-16 DIAGNOSIS — K58.9 IRRITABLE BOWEL SYNDROME WITHOUT DIARRHEA: ICD-10-CM

## 2024-07-16 DIAGNOSIS — K21.9 GASTROESOPHAGEAL REFLUX DISEASE WITHOUT ESOPHAGITIS: ICD-10-CM

## 2024-07-16 DIAGNOSIS — Z00.00 ANNUAL PHYSICAL EXAM: ICD-10-CM

## 2024-07-16 DIAGNOSIS — F41.9 ANXIETY: ICD-10-CM

## 2024-07-16 DIAGNOSIS — Z79.899 POLYPHARMACY: Primary | ICD-10-CM

## 2024-07-16 DIAGNOSIS — R73.03 PREDIABETES: ICD-10-CM

## 2024-07-16 DIAGNOSIS — E78.2 MIXED HYPERLIPIDEMIA: ICD-10-CM

## 2024-07-16 DIAGNOSIS — H81.13 BENIGN PAROXYSMAL POSITIONAL VERTIGO DUE TO BILATERAL VESTIBULAR DISORDER: ICD-10-CM

## 2024-07-16 DIAGNOSIS — M15.9 PRIMARY OSTEOARTHRITIS INVOLVING MULTIPLE JOINTS: ICD-10-CM

## 2024-07-16 DIAGNOSIS — I10 PRIMARY HYPERTENSION: ICD-10-CM

## 2024-07-16 DIAGNOSIS — E55.9 VITAMIN D DEFICIENCY: ICD-10-CM

## 2024-07-16 PROCEDURE — 1157F ADVNC CARE PLAN IN RCRD: CPT | Performed by: STUDENT IN AN ORGANIZED HEALTH CARE EDUCATION/TRAINING PROGRAM

## 2024-07-16 PROCEDURE — 1160F RVW MEDS BY RX/DR IN RCRD: CPT | Performed by: STUDENT IN AN ORGANIZED HEALTH CARE EDUCATION/TRAINING PROGRAM

## 2024-07-16 PROCEDURE — 99214 OFFICE O/P EST MOD 30 MIN: CPT | Performed by: STUDENT IN AN ORGANIZED HEALTH CARE EDUCATION/TRAINING PROGRAM

## 2024-07-16 PROCEDURE — 1159F MED LIST DOCD IN RCRD: CPT | Performed by: STUDENT IN AN ORGANIZED HEALTH CARE EDUCATION/TRAINING PROGRAM

## 2024-07-16 PROCEDURE — 1126F AMNT PAIN NOTED NONE PRSNT: CPT | Performed by: STUDENT IN AN ORGANIZED HEALTH CARE EDUCATION/TRAINING PROGRAM

## 2024-07-16 PROCEDURE — 3079F DIAST BP 80-89 MM HG: CPT | Performed by: STUDENT IN AN ORGANIZED HEALTH CARE EDUCATION/TRAINING PROGRAM

## 2024-07-16 PROCEDURE — 3075F SYST BP GE 130 - 139MM HG: CPT | Performed by: STUDENT IN AN ORGANIZED HEALTH CARE EDUCATION/TRAINING PROGRAM

## 2024-07-16 PROCEDURE — G2211 COMPLEX E/M VISIT ADD ON: HCPCS | Performed by: STUDENT IN AN ORGANIZED HEALTH CARE EDUCATION/TRAINING PROGRAM

## 2024-07-16 PROCEDURE — 1036F TOBACCO NON-USER: CPT | Performed by: STUDENT IN AN ORGANIZED HEALTH CARE EDUCATION/TRAINING PROGRAM

## 2024-07-16 ASSESSMENT — PAIN SCALES - GENERAL: PAINLEVEL: 0-NO PAIN

## 2024-07-16 ASSESSMENT — ENCOUNTER SYMPTOMS
CARDIOVASCULAR NEGATIVE: 1
GASTROINTESTINAL NEGATIVE: 1
CONSTITUTIONAL NEGATIVE: 1
RESPIRATORY NEGATIVE: 1

## 2024-07-16 ASSESSMENT — PATIENT HEALTH QUESTIONNAIRE - PHQ9
1. LITTLE INTEREST OR PLEASURE IN DOING THINGS: NOT AT ALL
SUM OF ALL RESPONSES TO PHQ9 QUESTIONS 1 AND 2: 0
2. FEELING DOWN, DEPRESSED OR HOPELESS: NOT AT ALL

## 2024-07-16 NOTE — PROGRESS NOTES
[facility]: [facility]  PROGRESS NOTE      Claudine Jones is a 89 y.o. female that is presenting today for Follow-up.    Assessment/Plan   - Significant medication and problem list reconciliation done today.  - Today's appointment is to keep the patient in compliance with their controlled substance agreement (CSA).  - Patient has signed their CSA at an earlier date.  - I do believe that the medication(s) are medically necessary. Patient has tried multiple alternatives to controlled substances in the past with limited success.  - The patient has completed their urine drug screen (UDS) this year.  - The patient's symptoms are well-controlled on current therapy. No need for changes at this time.  - PDMP reviewed and appropriate.  - I have considered the risks of abuse, dependence, addiction, and/or diversion and have discussed these with the patient during our appointment.  - Patient has been doing well on her current dose of ativan. I see no reason to make significant changes at this time.  - Patient did not require labwork for this appointment.  - Will order labwork for the patient's next appointment.  - Vitals look great. Do not need to make changes at this time.  - Encouraged continued diet and exercise modification.  - Patient actually went to physical therapy and has begun to note significant improvement in her debility / weakness / dizziness.  - Patient noting that she has had a skin lesion on her chest wall. On exam, appears to be somewhat fleshy colored. Discussed with her that she needs to be evaluated by dermatology to see if this needs biopsied / removed.    Diagnoses and all orders for this visit:  Polypharmacy  Benign paroxysmal positional vertigo due to bilateral vestibular disorder  -     CBC and Auto Differential; Future  -     Basic Metabolic Panel; Future  -     Hepatic Function Panel; Future  -     Lipid Panel; Future  -     Hemoglobin A1C; Future  -     Vitamin D  25-Hydroxy,Total (for eval of Vitamin D levels); Future  -     TSH with reflex to Free T4 if abnormal; Future  -     Iron and TIBC; Future  -     Ferritin; Future  -     Vitamin B12; Future  -     Folate; Future  Gastroesophageal reflux disease without esophagitis  -     CBC and Auto Differential; Future  -     Basic Metabolic Panel; Future  -     Hepatic Function Panel; Future  -     Lipid Panel; Future  -     Hemoglobin A1C; Future  -     Vitamin D 25-Hydroxy,Total (for eval of Vitamin D levels); Future  -     TSH with reflex to Free T4 if abnormal; Future  -     Iron and TIBC; Future  -     Ferritin; Future  -     Vitamin B12; Future  -     Folate; Future  Primary osteoarthritis involving multiple joints  -     CBC and Auto Differential; Future  -     Basic Metabolic Panel; Future  -     Hepatic Function Panel; Future  -     Lipid Panel; Future  -     Hemoglobin A1C; Future  -     Vitamin D 25-Hydroxy,Total (for eval of Vitamin D levels); Future  -     TSH with reflex to Free T4 if abnormal; Future  -     Iron and TIBC; Future  -     Ferritin; Future  -     Vitamin B12; Future  -     Folate; Future  Irritable bowel syndrome without diarrhea  -     CBC and Auto Differential; Future  -     Basic Metabolic Panel; Future  -     Hepatic Function Panel; Future  -     Lipid Panel; Future  -     Hemoglobin A1C; Future  -     Vitamin D 25-Hydroxy,Total (for eval of Vitamin D levels); Future  -     TSH with reflex to Free T4 if abnormal; Future  -     Iron and TIBC; Future  -     Ferritin; Future  -     Vitamin B12; Future  -     Folate; Future  Anemia, unspecified type  -     CBC and Auto Differential; Future  -     Basic Metabolic Panel; Future  -     Hepatic Function Panel; Future  -     Lipid Panel; Future  -     Hemoglobin A1C; Future  -     Vitamin D 25-Hydroxy,Total (for eval of Vitamin D levels); Future  -     TSH with reflex to Free T4 if abnormal; Future  -     Iron and TIBC; Future  -     Ferritin; Future  -      Vitamin B12; Future  -     Folate; Future  Cerebrovascular disease  -     CBC and Auto Differential; Future  -     Basic Metabolic Panel; Future  -     Hepatic Function Panel; Future  -     Lipid Panel; Future  -     Hemoglobin A1C; Future  -     Vitamin D 25-Hydroxy,Total (for eval of Vitamin D levels); Future  -     TSH with reflex to Free T4 if abnormal; Future  -     Iron and TIBC; Future  -     Ferritin; Future  -     Vitamin B12; Future  -     Folate; Future  Vitamin B12 deficiency  -     CBC and Auto Differential; Future  -     Basic Metabolic Panel; Future  -     Hepatic Function Panel; Future  -     Lipid Panel; Future  -     Hemoglobin A1C; Future  -     Vitamin D 25-Hydroxy,Total (for eval of Vitamin D levels); Future  -     TSH with reflex to Free T4 if abnormal; Future  -     Iron and TIBC; Future  -     Ferritin; Future  -     Vitamin B12; Future  -     Folate; Future  Mixed hyperlipidemia  -     CBC and Auto Differential; Future  -     Basic Metabolic Panel; Future  -     Hepatic Function Panel; Future  -     Lipid Panel; Future  -     Hemoglobin A1C; Future  -     Vitamin D 25-Hydroxy,Total (for eval of Vitamin D levels); Future  -     TSH with reflex to Free T4 if abnormal; Future  -     Iron and TIBC; Future  -     Ferritin; Future  -     Vitamin B12; Future  -     Folate; Future  Primary hypertension  -     CBC and Auto Differential; Future  -     Basic Metabolic Panel; Future  -     Hepatic Function Panel; Future  -     Lipid Panel; Future  -     Hemoglobin A1C; Future  -     Vitamin D 25-Hydroxy,Total (for eval of Vitamin D levels); Future  -     TSH with reflex to Free T4 if abnormal; Future  -     Iron and TIBC; Future  -     Ferritin; Future  -     Vitamin B12; Future  -     Folate; Future  Vitamin D deficiency  -     CBC and Auto Differential; Future  -     Basic Metabolic Panel; Future  -     Hepatic Function Panel; Future  -     Lipid Panel; Future  -     Hemoglobin A1C; Future  -      Vitamin D 25-Hydroxy,Total (for eval of Vitamin D levels); Future  -     TSH with reflex to Free T4 if abnormal; Future  -     Iron and TIBC; Future  -     Ferritin; Future  -     Vitamin B12; Future  -     Folate; Future  Prediabetes  -     CBC and Auto Differential; Future  -     Basic Metabolic Panel; Future  -     Hepatic Function Panel; Future  -     Lipid Panel; Future  -     Hemoglobin A1C; Future  -     Vitamin D 25-Hydroxy,Total (for eval of Vitamin D levels); Future  -     TSH with reflex to Free T4 if abnormal; Future  -     Iron and TIBC; Future  -     Ferritin; Future  -     Vitamin B12; Future  -     Folate; Future  Anxiety  -     CBC and Auto Differential; Future  -     Basic Metabolic Panel; Future  -     Hepatic Function Panel; Future  -     Lipid Panel; Future  -     Hemoglobin A1C; Future  -     Vitamin D 25-Hydroxy,Total (for eval of Vitamin D levels); Future  -     TSH with reflex to Free T4 if abnormal; Future  -     Iron and TIBC; Future  -     Ferritin; Future  -     Vitamin B12; Future  -     Folate; Future  Encounter for routine laboratory testing  Annual physical exam  -     Follow Up In Primary Care    Current Outpatient Medications   Medication Instructions    acetaminophen (TYLENOL 8 HOUR) 650 mg, oral, Every 8 hours PRN, Do not crush, chew, or split.    aspirin 81 mg, oral, Daily    atorvastatin (LIPITOR) 20 mg, oral, Daily    famotidine (PEPCID) 20 mg, oral, Nightly PRN    lisinopril 20 mg, oral, Daily    loperamide-simethicone 2-125 mg tablet per tablet 1 capsule, oral, 2 times daily (morning and midday)    loratadine (CLARITIN) 10 mg, oral, Daily PRN    LORazepam (ATIVAN) 1 mg, oral, 2 times daily PRN    melatonin 10 mg, oral, Daily RT    multivitamin tablet 1 tablet, oral, Daily    omega-3 (Fish Oil) 60- mg capsule 1,000 mg, oral, Daily     Subjective   - The patient otherwise feels well and denies any acute symptoms or concerns at this time.  - The patient denies any changes  or progression of their chronic medical problems.  - The patient denies any problems or concerns with their medications.      Review of Systems   Constitutional: Negative.    Respiratory: Negative.     Cardiovascular: Negative.    Gastrointestinal: Negative.    All other systems reviewed and are negative.     Objective   Vitals:    07/16/24 1133   BP: 132/80   Pulse: 62   Temp: 36.5 °C (97.7 °F)   SpO2: 98%      Body mass index is 24.87 kg/m².  Physical Exam  Vitals and nursing note reviewed.   Constitutional:       General: She is not in acute distress.  Neck:      Vascular: No carotid bruit.   Cardiovascular:      Rate and Rhythm: Normal rate and regular rhythm.      Heart sounds: Normal heart sounds.   Pulmonary:      Effort: Pulmonary effort is normal.      Breath sounds: Normal breath sounds.   Musculoskeletal:         General: No swelling.   Neurological:      Mental Status: She is alert. Mental status is at baseline.   Psychiatric:         Mood and Affect: Mood normal.       Diagnostic Results   Lab Results   Component Value Date    GLUCOSE 108 (H) 04/06/2024    CALCIUM 9.7 04/06/2024     04/06/2024    K 5.0 04/06/2024    CO2 27 04/06/2024     04/06/2024    BUN 21 04/06/2024    CREATININE 0.80 04/06/2024     Lab Results   Component Value Date    ALT 12 04/06/2024    AST 18 04/06/2024    ALKPHOS 75 04/06/2024    BILITOT 0.2 04/06/2024     Lab Results   Component Value Date    WBC 7.1 04/06/2024    HGB 13.2 04/06/2024    HCT 40.1 04/06/2024    MCV 91 04/06/2024     04/06/2024     Lab Results   Component Value Date    CHOL 115 (L) 04/04/2023    CHOL 145 04/28/2022    CHOL 136 11/26/2021     Lab Results   Component Value Date    HDL 59 04/04/2023    HDL 56 04/28/2022    HDL 54 11/26/2021     Lab Results   Component Value Date    LDLCALC 41 (L) 04/04/2023    LDLCALC 68 04/28/2022    LDLCALC 62 (L) 11/26/2021     Lab Results   Component Value Date    TRIG 73 04/04/2023    TRIG 107 04/28/2022     "TRIG 99 11/26/2021     No components found for: \"CHOLHDL\"  Lab Results   Component Value Date    HGBA1C 5.7 04/04/2023     Other labs not included in the list above were reviewed either before or during this encounter.    History    Past Medical History:   Diagnosis Date    Arthritis     GERD (gastroesophageal reflux disease)     Hand fracture 01/17/2024    right thumb fracture    High blood pressure     High cholesterol      Past Surgical History:   Procedure Laterality Date    MR NECK ANGIO WO IV CONTRAST  10/17/2019    MR NECK ANGIO WO IV CONTRAST [facility]    TOTAL KNEE ARTHROPLASTY Bilateral      No family history on file.  Social History     Socioeconomic History    Marital status:      Spouse name: Not on file    Number of children: Not on file    Years of education: Not on file    Highest education level: Not on file   Occupational History    Not on file   Tobacco Use    Smoking status: Never     Passive exposure: Never    Smokeless tobacco: Never   Vaping Use    Vaping status: Never Used   Substance and Sexual Activity    Alcohol use: Yes     Alcohol/week: 7.0 standard drinks of alcohol     Types: 7 Glasses of wine per week    Drug use: Never    Sexual activity: Defer   Other Topics Concern    Not on file   Social History Narrative    ** Merged History Encounter **         ** Merged History Encounter **          Social Determinants of Health     Financial Resource Strain: Not on file   Food Insecurity: Not on file   Transportation Needs: Not on file   Physical Activity: Not on file   Stress: Not on file   Social Connections: Not on file   Intimate Partner Violence: Not on file   Housing Stability: Not on file     No Known Allergies  Current Outpatient Medications on File Prior to Visit   Medication Sig Dispense Refill    acetaminophen (Tylenol 8 HOUR) 650 mg ER tablet Take 1 tablet (650 mg) by mouth every 8 hours if needed for mild pain (1 - 3). Do not crush, chew, or split.      aspirin " 81 mg EC tablet Take 1 tablet (81 mg) by mouth once daily.      atorvastatin (Lipitor) 20 mg tablet Take 1 tablet (20 mg) by mouth once daily. 90 tablet 3    famotidine (Pepcid) 20 mg tablet Take 1 tablet (20 mg) by mouth as needed at bedtime for heartburn or indigestion.      lisinopril 20 mg tablet Take 1 tablet (20 mg) by mouth once daily. 90 tablet 3    loperamide-simethicone 2-125 mg tablet per tablet Take 1 capsule by mouth 2 times daily (morning and midday).      loratadine (Claritin) 10 mg tablet Take 1 tablet (10 mg) by mouth once daily as needed for allergies.      LORazepam (Ativan) 1 mg tablet Take 1 tablet (1 mg) by mouth 2 times a day as needed for anxiety.      melatonin 10 mg tablet,disintegrating Take 10 mg by mouth once daily.      multivitamin tablet Take 1 tablet by mouth once daily.      omega-3 (Fish Oil) 60- mg capsule Take 2 capsules (1,000 mg) by mouth once daily.       No current facility-administered medications on file prior to visit.     Immunization History   Administered Date(s) Administered    Moderna COVID-19 vaccine, bivalent, blue cap/gray label *Check age/dose* 11/22/2022    Moderna SARS-CoV-2 Vaccination 02/05/2021, 03/05/2021, 10/22/2021, 04/06/2022    Pneumococcal conjugate vaccine, 13-valent (PREVNAR 13) 02/28/2017    Pneumococcal polysaccharide vaccine, 23-valent, age 2 years and older (PNEUMOVAX 23) 12/28/2018    Tdap vaccine, age 7 year and older (BOOSTRIX, ADACEL) 01/17/2024     Patient's medical history was reviewed and updated either before or during this encounter.       Cash Palencia MD

## 2024-07-16 NOTE — PATIENT INSTRUCTIONS
- Significant medication and problem list reconciliation done today.  - Today's appointment is to keep the patient in compliance with their controlled substance agreement (CSA).  - Patient has signed their CSA at an earlier date.  - I do believe that the medication(s) are medically necessary. Patient has tried multiple alternatives to controlled substances in the past with limited success.  - The patient has completed their urine drug screen (UDS) this year.  - The patient's symptoms are well-controlled on current therapy. No need for changes at this time.  - PDMP reviewed and appropriate.  - I have considered the risks of abuse, dependence, addiction, and/or diversion and have discussed these with the patient during our appointment.  - Patient has been doing well on her current dose of ativan. I see no reason to make significant changes at this time.  - Patient did not require labwork for this appointment.  - Will order labwork for the patient's next appointment.  - Vitals look great. Do not need to make changes at this time.  - Encouraged continued diet and exercise modification.  - Patient actually went to physical therapy and has begun to note significant improvement in her debility / weakness / dizziness.  - Patient noting that she has had a skin lesion on her chest wall. On exam, appears to be somewhat fleshy colored. Discussed with her that she needs to be evaluated by dermatology to see if this needs biopsied / removed. (Discussed with them that there is a dermatology office on the first floor of this building, [facility], that does not require a referral; can be reached at [phone]).

## 2024-07-17 ENCOUNTER — APPOINTMENT (OUTPATIENT)
Dept: PHYSICAL THERAPY | Facility: CLINIC | Age: 89
End: 2024-07-17
Payer: MEDICARE

## 2024-07-25 ENCOUNTER — TELEPHONE (OUTPATIENT)
Dept: PRIMARY CARE | Facility: CLINIC | Age: 89
End: 2024-07-25
Payer: MEDICARE

## 2024-08-06 ENCOUNTER — APPOINTMENT (OUTPATIENT)
Dept: PHYSICAL THERAPY | Facility: CLINIC | Age: 89
End: 2024-08-06
Payer: MEDICARE

## 2024-10-22 ENCOUNTER — LAB (OUTPATIENT)
Dept: LAB | Facility: LAB | Age: 89
End: 2024-10-22
Payer: MEDICARE

## 2024-10-22 DIAGNOSIS — H81.13 BENIGN PAROXYSMAL POSITIONAL VERTIGO DUE TO BILATERAL VESTIBULAR DISORDER: ICD-10-CM

## 2024-10-22 DIAGNOSIS — I67.9 CEREBROVASCULAR DISEASE: ICD-10-CM

## 2024-10-22 DIAGNOSIS — M15.0 PRIMARY OSTEOARTHRITIS INVOLVING MULTIPLE JOINTS: ICD-10-CM

## 2024-10-22 DIAGNOSIS — D64.9 ANEMIA, UNSPECIFIED TYPE: ICD-10-CM

## 2024-10-22 DIAGNOSIS — E55.9 VITAMIN D DEFICIENCY: ICD-10-CM

## 2024-10-22 DIAGNOSIS — K58.9 IRRITABLE BOWEL SYNDROME WITHOUT DIARRHEA: ICD-10-CM

## 2024-10-22 DIAGNOSIS — E53.8 VITAMIN B12 DEFICIENCY: ICD-10-CM

## 2024-10-22 DIAGNOSIS — I10 PRIMARY HYPERTENSION: ICD-10-CM

## 2024-10-22 DIAGNOSIS — F41.9 ANXIETY: ICD-10-CM

## 2024-10-22 DIAGNOSIS — R73.03 PREDIABETES: ICD-10-CM

## 2024-10-22 DIAGNOSIS — K21.9 GASTROESOPHAGEAL REFLUX DISEASE WITHOUT ESOPHAGITIS: ICD-10-CM

## 2024-10-22 DIAGNOSIS — E78.2 MIXED HYPERLIPIDEMIA: ICD-10-CM

## 2024-10-22 LAB
25(OH)D3 SERPL-MCNC: 73 NG/ML (ref 30–100)
ALBUMIN SERPL BCP-MCNC: 4 G/DL (ref 3.4–5)
ALP SERPL-CCNC: 62 U/L (ref 33–136)
ALT SERPL W P-5'-P-CCNC: 15 U/L (ref 7–45)
ANION GAP SERPL CALCULATED.3IONS-SCNC: 12 MMOL/L (ref 10–20)
AST SERPL W P-5'-P-CCNC: 21 U/L (ref 9–39)
BASOPHILS # BLD AUTO: 0.03 X10*3/UL (ref 0–0.1)
BASOPHILS NFR BLD AUTO: 0.4 %
BILIRUB DIRECT SERPL-MCNC: 0.1 MG/DL (ref 0–0.3)
BILIRUB SERPL-MCNC: 0.6 MG/DL (ref 0–1.2)
BUN SERPL-MCNC: 15 MG/DL (ref 6–23)
CALCIUM SERPL-MCNC: 10 MG/DL (ref 8.6–10.3)
CHLORIDE SERPL-SCNC: 103 MMOL/L (ref 98–107)
CHOLEST SERPL-MCNC: 126 MG/DL (ref 0–199)
CHOLEST/HDLC SERPL: 2.3 {RATIO}
CO2 SERPL-SCNC: 29 MMOL/L (ref 21–32)
CREAT SERPL-MCNC: 0.94 MG/DL (ref 0.5–1.05)
EGFRCR SERPLBLD CKD-EPI 2021: 57 ML/MIN/1.73M*2
EOSINOPHIL # BLD AUTO: 0.21 X10*3/UL (ref 0–0.4)
EOSINOPHIL NFR BLD AUTO: 2.5 %
ERYTHROCYTE [DISTWIDTH] IN BLOOD BY AUTOMATED COUNT: 12.5 % (ref 11.5–14.5)
EST. AVERAGE GLUCOSE BLD GHB EST-MCNC: 117 MG/DL
FERRITIN SERPL-MCNC: 311 NG/ML (ref 8–150)
FOLATE SERPL-MCNC: >24 NG/ML
GLUCOSE SERPL-MCNC: 109 MG/DL (ref 74–99)
HBA1C MFR BLD: 5.7 %
HCT VFR BLD AUTO: 39.3 % (ref 36–46)
HDLC SERPL-MCNC: 54.2 MG/DL
HGB BLD-MCNC: 13 G/DL (ref 12–16)
IMM GRANULOCYTES # BLD AUTO: 0.03 X10*3/UL (ref 0–0.5)
IMM GRANULOCYTES NFR BLD AUTO: 0.4 % (ref 0–0.9)
IRON SATN MFR SERPL: 53 % (ref 25–45)
IRON SERPL-MCNC: 133 UG/DL (ref 35–150)
LDLC SERPL CALC-MCNC: 53 MG/DL
LYMPHOCYTES # BLD AUTO: 3.14 X10*3/UL (ref 0.8–3)
LYMPHOCYTES NFR BLD AUTO: 36.8 %
MCH RBC QN AUTO: 31.1 PG (ref 26–34)
MCHC RBC AUTO-ENTMCNC: 33.1 G/DL (ref 32–36)
MCV RBC AUTO: 94 FL (ref 80–100)
MONOCYTES # BLD AUTO: 0.67 X10*3/UL (ref 0.05–0.8)
MONOCYTES NFR BLD AUTO: 7.9 %
NEUTROPHILS # BLD AUTO: 4.45 X10*3/UL (ref 1.6–5.5)
NEUTROPHILS NFR BLD AUTO: 52 %
NON HDL CHOLESTEROL: 72 MG/DL (ref 0–149)
NRBC BLD-RTO: 0 /100 WBCS (ref 0–0)
PLATELET # BLD AUTO: 237 X10*3/UL (ref 150–450)
POTASSIUM SERPL-SCNC: 4.5 MMOL/L (ref 3.5–5.3)
PROT SERPL-MCNC: 6.6 G/DL (ref 6.4–8.2)
RBC # BLD AUTO: 4.18 X10*6/UL (ref 4–5.2)
SODIUM SERPL-SCNC: 139 MMOL/L (ref 136–145)
TIBC SERPL-MCNC: 249 UG/DL (ref 240–445)
TRIGL SERPL-MCNC: 92 MG/DL (ref 0–149)
TSH SERPL-ACNC: 1.55 MIU/L (ref 0.44–3.98)
UIBC SERPL-MCNC: 116 UG/DL (ref 110–370)
VIT B12 SERPL-MCNC: 826 PG/ML (ref 211–911)
VLDL: 18 MG/DL (ref 0–40)
WBC # BLD AUTO: 8.5 X10*3/UL (ref 4.4–11.3)

## 2024-10-22 PROCEDURE — 85025 COMPLETE CBC W/AUTO DIFF WBC: CPT

## 2024-10-22 PROCEDURE — 82728 ASSAY OF FERRITIN: CPT

## 2024-10-22 PROCEDURE — 83036 HEMOGLOBIN GLYCOSYLATED A1C: CPT

## 2024-10-22 PROCEDURE — 82306 VITAMIN D 25 HYDROXY: CPT

## 2024-10-22 PROCEDURE — 84443 ASSAY THYROID STIM HORMONE: CPT

## 2024-10-22 PROCEDURE — 83540 ASSAY OF IRON: CPT

## 2024-10-22 PROCEDURE — 82746 ASSAY OF FOLIC ACID SERUM: CPT

## 2024-10-22 PROCEDURE — 36415 COLL VENOUS BLD VENIPUNCTURE: CPT

## 2024-10-22 PROCEDURE — 82607 VITAMIN B-12: CPT

## 2024-10-22 PROCEDURE — 80061 LIPID PANEL: CPT

## 2024-10-22 PROCEDURE — 83550 IRON BINDING TEST: CPT

## 2024-10-22 PROCEDURE — 82248 BILIRUBIN DIRECT: CPT

## 2024-10-22 PROCEDURE — 80053 COMPREHEN METABOLIC PANEL: CPT

## 2024-10-23 ENCOUNTER — OFFICE VISIT (OUTPATIENT)
Dept: PRIMARY CARE | Facility: CLINIC | Age: 89
End: 2024-10-23
Payer: MEDICARE

## 2024-10-23 VITALS
WEIGHT: 136 LBS | SYSTOLIC BLOOD PRESSURE: 138 MMHG | HEART RATE: 69 BPM | OXYGEN SATURATION: 96 % | HEIGHT: 62 IN | BODY MASS INDEX: 25.03 KG/M2 | DIASTOLIC BLOOD PRESSURE: 80 MMHG | TEMPERATURE: 97.7 F

## 2024-10-23 DIAGNOSIS — D64.9 ANEMIA, UNSPECIFIED TYPE: ICD-10-CM

## 2024-10-23 DIAGNOSIS — H81.13 BENIGN PAROXYSMAL POSITIONAL VERTIGO DUE TO BILATERAL VESTIBULAR DISORDER: ICD-10-CM

## 2024-10-23 DIAGNOSIS — R73.03 PREDIABETES: ICD-10-CM

## 2024-10-23 DIAGNOSIS — E78.2 MIXED HYPERLIPIDEMIA: ICD-10-CM

## 2024-10-23 DIAGNOSIS — Z01.89 ENCOUNTER FOR ROUTINE LABORATORY TESTING: ICD-10-CM

## 2024-10-23 DIAGNOSIS — K58.9 IRRITABLE BOWEL SYNDROME WITHOUT DIARRHEA: ICD-10-CM

## 2024-10-23 DIAGNOSIS — M15.0 PRIMARY OSTEOARTHRITIS INVOLVING MULTIPLE JOINTS: ICD-10-CM

## 2024-10-23 DIAGNOSIS — I10 PRIMARY HYPERTENSION: ICD-10-CM

## 2024-10-23 DIAGNOSIS — Z23 ENCOUNTER FOR IMMUNIZATION: ICD-10-CM

## 2024-10-23 DIAGNOSIS — I67.9 CEREBROVASCULAR DISEASE: ICD-10-CM

## 2024-10-23 DIAGNOSIS — E55.9 VITAMIN D DEFICIENCY: ICD-10-CM

## 2024-10-23 DIAGNOSIS — Z79.899 POLYPHARMACY: Primary | ICD-10-CM

## 2024-10-23 DIAGNOSIS — Z00.00 ANNUAL PHYSICAL EXAM: ICD-10-CM

## 2024-10-23 DIAGNOSIS — E53.8 VITAMIN B12 DEFICIENCY: ICD-10-CM

## 2024-10-23 DIAGNOSIS — K21.9 GASTROESOPHAGEAL REFLUX DISEASE WITHOUT ESOPHAGITIS: ICD-10-CM

## 2024-10-23 DIAGNOSIS — F41.9 ANXIETY: ICD-10-CM

## 2024-10-23 PROCEDURE — 3075F SYST BP GE 130 - 139MM HG: CPT | Performed by: STUDENT IN AN ORGANIZED HEALTH CARE EDUCATION/TRAINING PROGRAM

## 2024-10-23 PROCEDURE — 99214 OFFICE O/P EST MOD 30 MIN: CPT | Performed by: STUDENT IN AN ORGANIZED HEALTH CARE EDUCATION/TRAINING PROGRAM

## 2024-10-23 PROCEDURE — 1126F AMNT PAIN NOTED NONE PRSNT: CPT | Performed by: STUDENT IN AN ORGANIZED HEALTH CARE EDUCATION/TRAINING PROGRAM

## 2024-10-23 PROCEDURE — 1159F MED LIST DOCD IN RCRD: CPT | Performed by: STUDENT IN AN ORGANIZED HEALTH CARE EDUCATION/TRAINING PROGRAM

## 2024-10-23 PROCEDURE — 90662 IIV NO PRSV INCREASED AG IM: CPT | Performed by: STUDENT IN AN ORGANIZED HEALTH CARE EDUCATION/TRAINING PROGRAM

## 2024-10-23 PROCEDURE — G2211 COMPLEX E/M VISIT ADD ON: HCPCS | Performed by: STUDENT IN AN ORGANIZED HEALTH CARE EDUCATION/TRAINING PROGRAM

## 2024-10-23 PROCEDURE — 1157F ADVNC CARE PLAN IN RCRD: CPT | Performed by: STUDENT IN AN ORGANIZED HEALTH CARE EDUCATION/TRAINING PROGRAM

## 2024-10-23 PROCEDURE — 1036F TOBACCO NON-USER: CPT | Performed by: STUDENT IN AN ORGANIZED HEALTH CARE EDUCATION/TRAINING PROGRAM

## 2024-10-23 PROCEDURE — 3079F DIAST BP 80-89 MM HG: CPT | Performed by: STUDENT IN AN ORGANIZED HEALTH CARE EDUCATION/TRAINING PROGRAM

## 2024-10-23 RX ORDER — LORAZEPAM 1 MG/1
1 TABLET ORAL 2 TIMES DAILY PRN
Qty: 30 TABLET | Refills: 2 | Status: SHIPPED | OUTPATIENT
Start: 2024-10-23

## 2024-10-23 ASSESSMENT — ENCOUNTER SYMPTOMS
CONSTITUTIONAL NEGATIVE: 1
GASTROINTESTINAL NEGATIVE: 1
RESPIRATORY NEGATIVE: 1
CARDIOVASCULAR NEGATIVE: 1

## 2024-10-23 ASSESSMENT — PAIN SCALES - GENERAL: PAINLEVEL_OUTOF10: 0-NO PAIN

## 2024-10-23 NOTE — PROGRESS NOTES
[facility]: [facility]  PROGRESS NOTE      Claudine Jones is a 89 y.o. female that is presenting today for Follow-up.    Assessment/Plan   - Overall, the patient feels well and denies any acute symptoms / concerns at this time.  - Blood pressure at goal today.  - Encouraged continued dietary, exercise, and lifestyle modification.  - Significant medication and problem list reconciliation done today.   - Today's appointment is to keep the patient in compliance with their controlled substance agreement (CSA).   - The patient has already signed their CSA at an earlier appointment.  - The patient has completed their urine drug screen (UDS) this year.  - PDMP reviewed and appropriate.  - I have considered the risks of abuse, dependence, addiction, and/or diversion and have discussed these with the patient during our appointment.  - I do believe that the medication(s) are medically necessary. Patient has tried multiple alternatives to controlled substances in the past with limited success.  - The patient's symptoms are well-controlled on current therapy.  - Patient had labwork done for this appointment. Discussed today. Everything looked great.  - Patient will not require labwork for their next appointment.    Diagnoses and all orders for this visit:  Polypharmacy  -     LORazepam (Ativan) 1 mg tablet; Take 1 tablet (1 mg) by mouth 2 times a day as needed for anxiety.  -     Follow Up In Primary Care; Future  Benign paroxysmal positional vertigo due to bilateral vestibular disorder  Gastroesophageal reflux disease without esophagitis  Primary osteoarthritis involving multiple joints  Irritable bowel syndrome without diarrhea  Anemia, unspecified type  Cerebrovascular disease  Vitamin B12 deficiency  Mixed hyperlipidemia  Primary hypertension  Vitamin D deficiency  Prediabetes  Anxiety  -     LORazepam (Ativan) 1 mg tablet; Take 1 tablet (1 mg) by mouth 2 times a day as needed for anxiety.  -      Follow Up In Primary Care; Future  Encounter for routine laboratory testing  Encounter for immunization  -     Flu vaccine, trivalent, preservative free, HIGH-DOSE, age 65y+ (Fluzone)  Annual physical exam  -     Follow Up In Primary Care; Future    Current Outpatient Medications   Medication Instructions    acetaminophen (TYLENOL 8 HOUR) 650 mg, Every 8 hours PRN    aspirin 81 mg, oral, Daily    atorvastatin (LIPITOR) 20 mg, oral, Daily    famotidine (PEPCID) 20 mg, oral, Nightly PRN    lisinopril 20 mg, oral, Daily    loperamide-simethicone 2-125 mg tablet per tablet 1 capsule, oral, 2 times daily (morning and midday)    loratadine (CLARITIN) 10 mg, oral, Daily PRN    LORazepam (ATIVAN) 1 mg, oral, 2 times daily PRN    melatonin 10 mg, Daily RT    multivitamin tablet 1 tablet, oral, Daily    omega-3 (Fish Oil) 60- mg capsule 1,000 mg, oral, Daily     Subjective   - The patient otherwise feels well and denies any acute symptoms or concerns at this time.  - The patient denies any changes or progression of their chronic medical problems.  - The patient denies any problems or concerns with their medications.      Review of Systems   Constitutional: Negative.    Respiratory: Negative.     Cardiovascular: Negative.    Gastrointestinal: Negative.    All other systems reviewed and are negative.     Objective   Vitals:    10/23/24 0935   BP: 138/80   Pulse: 69   Temp: 36.5 °C (97.7 °F)   SpO2: 96%      Body mass index is 24.87 kg/m².  Physical Exam  Vitals and nursing note reviewed.   Constitutional:       General: She is not in acute distress.  Neck:      Vascular: No carotid bruit.   Cardiovascular:      Rate and Rhythm: Normal rate and regular rhythm.      Heart sounds: Normal heart sounds.   Pulmonary:      Effort: Pulmonary effort is normal.      Breath sounds: Normal breath sounds.   Musculoskeletal:         General: No swelling.   Neurological:      Mental Status: She is alert. Mental status is at baseline.  "  Psychiatric:         Mood and Affect: Mood normal.       Diagnostic Results   Lab Results   Component Value Date    GLUCOSE 109 (H) 10/22/2024    CALCIUM 10.0 10/22/2024     10/22/2024    K 4.5 10/22/2024    CO2 29 10/22/2024     10/22/2024    BUN 15 10/22/2024    CREATININE 0.94 10/22/2024     Lab Results   Component Value Date    ALT 15 10/22/2024    AST 21 10/22/2024    ALKPHOS 62 10/22/2024    BILITOT 0.6 10/22/2024     Lab Results   Component Value Date    WBC 8.5 10/22/2024    HGB 13.0 10/22/2024    HCT 39.3 10/22/2024    MCV 94 10/22/2024     10/22/2024     Lab Results   Component Value Date    CHOL 126 10/22/2024    CHOL 115 (L) 04/04/2023    CHOL 145 04/28/2022     Lab Results   Component Value Date    HDL 54.2 10/22/2024    HDL 59 04/04/2023    HDL 56 04/28/2022     Lab Results   Component Value Date    LDLCALC 53 10/22/2024    LDLCALC 41 (L) 04/04/2023    LDLCALC 68 04/28/2022     Lab Results   Component Value Date    TRIG 92 10/22/2024    TRIG 73 04/04/2023    TRIG 107 04/28/2022     No components found for: \"CHOLHDL\"  Lab Results   Component Value Date    HGBA1C 5.7 (H) 10/22/2024     Other labs not included in the list above were reviewed either before or during this encounter.    History    Past Medical History:   Diagnosis Date    Arthritis     GERD (gastroesophageal reflux disease)     Hand fracture 01/17/2024    right thumb fracture    High blood pressure     High cholesterol      Past Surgical History:   Procedure Laterality Date    MR NECK ANGIO WO IV CONTRAST  10/17/2019    MR NECK ANGIO WO IV CONTRAST [facility]    TOTAL KNEE ARTHROPLASTY Bilateral      No family history on file.  Social History     Socioeconomic History    Marital status:      Spouse name: Not on file    Number of children: Not on file    Years of education: Not on file    Highest education level: Not on file   Occupational History    Not on file   Tobacco Use    Smoking status: Never     " Passive exposure: Never    Smokeless tobacco: Never   Vaping Use    Vaping status: Never Used   Substance and Sexual Activity    Alcohol use: Yes     Alcohol/week: 7.0 standard drinks of alcohol     Types: 7 Glasses of wine per week    Drug use: Never    Sexual activity: Defer   Other Topics Concern    Not on file   Social History Narrative    ** Merged History Encounter **         ** Merged History Encounter **          Social Drivers of Health     Financial Resource Strain: Not on file   Food Insecurity: Not on file   Transportation Needs: Not on file   Physical Activity: Not on file   Stress: Not on file   Social Connections: Not on file   Intimate Partner Violence: Not on file   Housing Stability: Not on file     No Known Allergies  Current Outpatient Medications on File Prior to Visit   Medication Sig Dispense Refill    acetaminophen (Tylenol 8 HOUR) 650 mg ER tablet Take 1 tablet (650 mg) by mouth every 8 hours if needed for mild pain (1 - 3). Do not crush, chew, or split.      aspirin 81 mg EC tablet Take 1 tablet (81 mg) by mouth once daily.      atorvastatin (Lipitor) 20 mg tablet Take 1 tablet (20 mg) by mouth once daily. 90 tablet 3    famotidine (Pepcid) 20 mg tablet Take 1 tablet (20 mg) by mouth as needed at bedtime for heartburn or indigestion.      lisinopril 20 mg tablet Take 1 tablet (20 mg) by mouth once daily. 90 tablet 3    loperamide-simethicone 2-125 mg tablet per tablet Take 1 capsule by mouth 2 times daily (morning and midday).      loratadine (Claritin) 10 mg tablet Take 1 tablet (10 mg) by mouth once daily as needed for allergies.      melatonin 10 mg tablet,disintegrating Take 10 mg by mouth once daily.      multivitamin tablet Take 1 tablet by mouth once daily.      omega-3 (Fish Oil) 60- mg capsule Take 2 capsules (1,000 mg) by mouth once daily.      [DISCONTINUED] LORazepam (Ativan) 1 mg tablet Take 1 tablet (1 mg) by mouth 2 times a day as needed for anxiety.       No current  facility-administered medications on file prior to visit.     Immunization History   Administered Date(s) Administered    Flu vaccine, trivalent, preservative free, HIGH-DOSE, age 65y+ (Fluzone) 10/23/2024    Moderna COVID-19 vaccine, bivalent, blue cap/gray label *Check age/dose* 11/22/2022    Moderna SARS-CoV-2 Vaccination 02/05/2021, 03/05/2021, 10/22/2021, 04/06/2022    Pneumococcal conjugate vaccine, 13-valent (PREVNAR 13) 02/28/2017    Pneumococcal polysaccharide vaccine, 23-valent, age 2 years and older (PNEUMOVAX 23) 12/28/2018    Tdap vaccine, age 7 year and older (BOOSTRIX, ADACEL) 01/17/2024     Patient's medical history was reviewed and updated either before or during this encounter.       Cash Palencia MD

## 2024-10-23 NOTE — PATIENT INSTRUCTIONS
- Overall, the patient feels well and denies any acute symptoms / concerns at this time.  - Blood pressure at goal today.  - Encouraged continued dietary, exercise, and lifestyle modification.  - Significant medication and problem list reconciliation done today.   - Today's appointment is to keep the patient in compliance with their controlled substance agreement (CSA).   - The patient has already signed their CSA at an earlier appointment.  - The patient has completed their urine drug screen (UDS) this year.  - PDMP reviewed and appropriate.  - I have considered the risks of abuse, dependence, addiction, and/or diversion and have discussed these with the patient during our appointment.  - I do believe that the medication(s) are medically necessary. Patient has tried multiple alternatives to controlled substances in the past with limited success.  - The patient's symptoms are well-controlled on current therapy.  - Patient had labwork done for this appointment. Discussed today. Everything looked great.  - Patient will not require labwork for their next appointment.

## 2025-01-29 ENCOUNTER — APPOINTMENT (OUTPATIENT)
Dept: PRIMARY CARE | Facility: CLINIC | Age: OVER 89
End: 2025-01-29
Payer: MEDICARE

## 2025-01-29 ENCOUNTER — OFFICE VISIT (OUTPATIENT)
Dept: PRIMARY CARE | Facility: CLINIC | Age: OVER 89
End: 2025-01-29
Payer: MEDICARE

## 2025-01-29 VITALS
WEIGHT: 140 LBS | BODY MASS INDEX: 25.76 KG/M2 | HEART RATE: 59 BPM | OXYGEN SATURATION: 94 % | DIASTOLIC BLOOD PRESSURE: 60 MMHG | SYSTOLIC BLOOD PRESSURE: 124 MMHG | HEIGHT: 62 IN

## 2025-01-29 DIAGNOSIS — Z79.899 POLYPHARMACY: Primary | ICD-10-CM

## 2025-01-29 DIAGNOSIS — I10 PRIMARY HYPERTENSION: ICD-10-CM

## 2025-01-29 DIAGNOSIS — F41.9 ANXIETY: ICD-10-CM

## 2025-01-29 PROCEDURE — G2211 COMPLEX E/M VISIT ADD ON: HCPCS | Performed by: STUDENT IN AN ORGANIZED HEALTH CARE EDUCATION/TRAINING PROGRAM

## 2025-01-29 PROCEDURE — 99214 OFFICE O/P EST MOD 30 MIN: CPT | Performed by: STUDENT IN AN ORGANIZED HEALTH CARE EDUCATION/TRAINING PROGRAM

## 2025-01-29 PROCEDURE — 1159F MED LIST DOCD IN RCRD: CPT | Performed by: STUDENT IN AN ORGANIZED HEALTH CARE EDUCATION/TRAINING PROGRAM

## 2025-01-29 PROCEDURE — 3074F SYST BP LT 130 MM HG: CPT | Performed by: STUDENT IN AN ORGANIZED HEALTH CARE EDUCATION/TRAINING PROGRAM

## 2025-01-29 PROCEDURE — 1160F RVW MEDS BY RX/DR IN RCRD: CPT | Performed by: STUDENT IN AN ORGANIZED HEALTH CARE EDUCATION/TRAINING PROGRAM

## 2025-01-29 PROCEDURE — 1036F TOBACCO NON-USER: CPT | Performed by: STUDENT IN AN ORGANIZED HEALTH CARE EDUCATION/TRAINING PROGRAM

## 2025-01-29 PROCEDURE — 1126F AMNT PAIN NOTED NONE PRSNT: CPT | Performed by: STUDENT IN AN ORGANIZED HEALTH CARE EDUCATION/TRAINING PROGRAM

## 2025-01-29 PROCEDURE — 3078F DIAST BP <80 MM HG: CPT | Performed by: STUDENT IN AN ORGANIZED HEALTH CARE EDUCATION/TRAINING PROGRAM

## 2025-01-29 PROCEDURE — 1157F ADVNC CARE PLAN IN RCRD: CPT | Performed by: STUDENT IN AN ORGANIZED HEALTH CARE EDUCATION/TRAINING PROGRAM

## 2025-01-29 RX ORDER — FLUCONAZOLE 150 MG/1
150 TABLET ORAL AS NEEDED
COMMUNITY

## 2025-01-29 RX ORDER — FERROUS SULFATE 325(65) MG
325 TABLET, DELAYED RELEASE (ENTERIC COATED) ORAL DAILY
COMMUNITY

## 2025-01-29 ASSESSMENT — ENCOUNTER SYMPTOMS
GASTROINTESTINAL NEGATIVE: 1
RESPIRATORY NEGATIVE: 1
CARDIOVASCULAR NEGATIVE: 1
CONSTITUTIONAL NEGATIVE: 1

## 2025-01-29 ASSESSMENT — PAIN SCALES - GENERAL: PAINLEVEL_OUTOF10: 0-NO PAIN

## 2025-01-29 NOTE — PROGRESS NOTES
[facility]: [facility]  PROGRESS NOTE      Claudine Jones is a 89 y.o. female that is presenting today for Follow-up.    Assessment/Plan   - Overall, the patient feels well and denies any acute symptoms / concerns at this time.  - Blood pressure at goal today.  - Encouraged continued dietary, exercise, and lifestyle modification.  - Significant medication and problem list reconciliation done today.   - Today's appointment is to keep the patient in compliance with their controlled substance agreement (CSA).   - The patient has already signed their CSA at an earlier appointment.  - The patient has completed their urine drug screen (UDS) this year.  - I have considered the risks of abuse, dependence, addiction, and/or diversion and have discussed these with the patient during our appointment.  - I do believe that the medication(s) are medically necessary. Patient has tried multiple alternatives to controlled substances in the past with limited success.  - The patient's symptoms are well-controlled on current therapy.  - PDMP reviewed and appropriate.    - Labwork:   - Patient did not require labwork for this appointment.   - Will order labwork for the patient's next appointment. Encouraged the patient to get this labwork done one week prior to the next appointment.    Diagnoses and all orders for this visit:  Polypharmacy  Primary hypertension  -     Comprehensive Metabolic Panel; Future  -     CBC and Auto Differential; Future  Anxiety    Current Outpatient Medications   Medication Instructions    acetaminophen (TYLENOL 8 HOUR) 650 mg, Every 8 hours PRN    aspirin 81 mg, oral, Daily    atorvastatin (LIPITOR) 20 mg, oral, Daily    famotidine (PEPCID) 20 mg, oral, Nightly PRN    ferrous sulfate 325 mg, Daily    fluconazole (DIFLUCAN) 150 mg, As needed    lisinopril 20 mg, oral, Daily    loperamide-simethicone 2-125 mg tablet per tablet 1 capsule, oral, 2 times daily (morning and midday)     loratadine (CLARITIN) 10 mg, oral, Daily PRN    LORazepam (ATIVAN) 1 mg, oral, 2 times daily PRN    melatonin 10 mg, Daily RT    multivitamin tablet 1 tablet, oral, Daily    omega-3 (Fish Oil) 60- mg capsule 1,000 mg, oral, Daily     Subjective   - The patient otherwise feels well and denies any acute symptoms or concerns at this time.  - The patient denies any changes or progression of their chronic medical problems.  - The patient denies any problems or concerns with their medications.      Review of Systems   Constitutional: Negative.    Respiratory: Negative.     Cardiovascular: Negative.    Gastrointestinal: Negative.    All other systems reviewed and are negative.     Objective   Vitals:    01/29/25 1345   BP: 124/60   Pulse: 59   SpO2: 94%      Body mass index is 25.6 kg/m².  Physical Exam  Vitals and nursing note reviewed.   Constitutional:       General: She is not in acute distress.  Neck:      Vascular: No carotid bruit.   Cardiovascular:      Rate and Rhythm: Normal rate and regular rhythm.      Heart sounds: Normal heart sounds.   Pulmonary:      Effort: Pulmonary effort is normal.      Breath sounds: Normal breath sounds.   Musculoskeletal:         General: No swelling.   Neurological:      Mental Status: She is alert. Mental status is at baseline.   Psychiatric:         Mood and Affect: Mood normal.       Diagnostic Results   Lab Results   Component Value Date    GLUCOSE 109 (H) 10/22/2024    CALCIUM 10.0 10/22/2024     10/22/2024    K 4.5 10/22/2024    CO2 29 10/22/2024     10/22/2024    BUN 15 10/22/2024    CREATININE 0.94 10/22/2024     Lab Results   Component Value Date    ALT 15 10/22/2024    AST 21 10/22/2024    ALKPHOS 62 10/22/2024    BILITOT 0.6 10/22/2024     Lab Results   Component Value Date    WBC 8.5 10/22/2024    HGB 13.0 10/22/2024    HCT 39.3 10/22/2024    MCV 94 10/22/2024     10/22/2024     Lab Results   Component Value Date    CHOL 126 10/22/2024    CHOL  "115 (L) 04/04/2023    CHOL 145 04/28/2022     Lab Results   Component Value Date    HDL 54.2 10/22/2024    HDL 59 04/04/2023    HDL 56 04/28/2022     Lab Results   Component Value Date    LDLCALC 53 10/22/2024    LDLCALC 41 (L) 04/04/2023    LDLCALC 68 04/28/2022     Lab Results   Component Value Date    TRIG 92 10/22/2024    TRIG 73 04/04/2023    TRIG 107 04/28/2022     No components found for: \"CHOLHDL\"  Lab Results   Component Value Date    HGBA1C 5.7 (H) 10/22/2024     Other labs not included in the list above were reviewed either before or during this encounter.    History    Past Medical History:   Diagnosis Date    Arthritis     GERD (gastroesophageal reflux disease)     Hand fracture 01/17/2024    right thumb fracture    High blood pressure     High cholesterol      Past Surgical History:   Procedure Laterality Date    MR NECK ANGIO WO IV CONTRAST  10/17/2019    MR NECK ANGIO WO IV CONTRAST [facility]    TOTAL KNEE ARTHROPLASTY Bilateral      No family history on file.  Social History     Socioeconomic History    Marital status:      Spouse name: Not on file    Number of children: Not on file    Years of education: Not on file    Highest education level: Not on file   Occupational History    Not on file   Tobacco Use    Smoking status: Never     Passive exposure: Never    Smokeless tobacco: Never   Vaping Use    Vaping status: Never Used   Substance and Sexual Activity    Alcohol use: Yes     Alcohol/week: 7.0 standard drinks of alcohol     Types: 7 Glasses of wine per week    Drug use: Never    Sexual activity: Defer   Other Topics Concern    Not on file   Social History Narrative    ** Merged History Encounter **         ** Merged History Encounter **          Social Drivers of Health     Financial Resource Strain: Not on file   Food Insecurity: Not on file   Transportation Needs: Not on file   Physical Activity: Not on file   Stress: Not on file   Social Connections: Not on file "   Intimate Partner Violence: Not on file   Housing Stability: Not on file     No Known Allergies  Current Outpatient Medications on File Prior to Visit   Medication Sig Dispense Refill    acetaminophen (Tylenol 8 HOUR) 650 mg ER tablet Take 1 tablet (650 mg) by mouth every 8 hours if needed for mild pain (1 - 3). Do not crush, chew, or split.      aspirin 81 mg EC tablet Take 1 tablet (81 mg) by mouth once daily.      atorvastatin (Lipitor) 20 mg tablet Take 1 tablet (20 mg) by mouth once daily. 90 tablet 3    famotidine (Pepcid) 20 mg tablet Take 1 tablet (20 mg) by mouth as needed at bedtime for heartburn or indigestion.      ferrous sulfate 325 (65 Fe) MG EC tablet Take 1 tablet by mouth once daily. Do not crush, chew, or split.      fluconazole (Diflucan) 150 mg tablet Take 1 tablet (150 mg) by mouth if needed.      lisinopril 20 mg tablet Take 1 tablet (20 mg) by mouth once daily. 90 tablet 3    loperamide-simethicone 2-125 mg tablet per tablet Take 1 capsule by mouth 2 times daily (morning and midday).      loratadine (Claritin) 10 mg tablet Take 1 tablet (10 mg) by mouth once daily as needed for allergies.      LORazepam (Ativan) 1 mg tablet Take 1 tablet (1 mg) by mouth 2 times a day as needed for anxiety. 30 tablet 2    melatonin 10 mg tablet,disintegrating Take 10 mg by mouth once daily.      multivitamin tablet Take 1 tablet by mouth once daily.      omega-3 (Fish Oil) 60- mg capsule Take 2 capsules (1,000 mg) by mouth once daily.       No current facility-administered medications on file prior to visit.     Immunization History   Administered Date(s) Administered    Flu vaccine, trivalent, preservative free, HIGH-DOSE, age 65y+ (Fluzone) 10/23/2024    Moderna COVID-19 vaccine, bivalent, blue cap/gray label *Check age/dose* 11/22/2022    Moderna SARS-CoV-2 Vaccination 02/05/2021, 03/05/2021, 10/22/2021, 04/06/2022    Pneumococcal conjugate vaccine, 13-valent (PREVNAR 13) 02/28/2017    Pneumococcal  polysaccharide vaccine, 23-valent, age 2 years and older (PNEUMOVAX 23) 12/28/2018    Tdap vaccine, age 7 year and older (BOOSTRIX, ADACEL) 01/17/2024     Patient's medical history was reviewed and updated either before or during this encounter.       Cash Palencia MD

## 2025-01-29 NOTE — PATIENT INSTRUCTIONS
- Overall, the patient feels well and denies any acute symptoms / concerns at this time.  - Blood pressure at goal today.  - Encouraged continued dietary, exercise, and lifestyle modification.  - Significant medication and problem list reconciliation done today.   - Today's appointment is to keep the patient in compliance with their controlled substance agreement (CSA).   - The patient has already signed their CSA at an earlier appointment.  - The patient has completed their urine drug screen (UDS) this year.  - I have considered the risks of abuse, dependence, addiction, and/or diversion and have discussed these with the patient during our appointment.  - I do believe that the medication(s) are medically necessary. Patient has tried multiple alternatives to controlled substances in the past with limited success.  - The patient's symptoms are well-controlled on current therapy.  - PDMP reviewed and appropriate.    - Labwork:   - Patient did not require labwork for this appointment.   - Will order labwork for the patient's next appointment. Encouraged the patient to get this labwork done one week prior to the next appointment.

## 2025-03-11 ENCOUNTER — APPOINTMENT (OUTPATIENT)
Dept: RADIOLOGY | Facility: HOSPITAL | Age: OVER 89
End: 2025-03-11
Payer: MEDICARE

## 2025-03-11 ENCOUNTER — HOSPITAL ENCOUNTER (EMERGENCY)
Facility: HOSPITAL | Age: OVER 89
Discharge: HOME | End: 2025-03-11
Payer: MEDICARE

## 2025-03-11 VITALS
DIASTOLIC BLOOD PRESSURE: 66 MMHG | OXYGEN SATURATION: 96 % | TEMPERATURE: 98.4 F | HEIGHT: 62 IN | SYSTOLIC BLOOD PRESSURE: 216 MMHG | WEIGHT: 136 LBS | HEART RATE: 62 BPM | RESPIRATION RATE: 18 BRPM | BODY MASS INDEX: 25.03 KG/M2

## 2025-03-11 DIAGNOSIS — R19.7 DIARRHEA, UNSPECIFIED TYPE: Primary | ICD-10-CM

## 2025-03-11 LAB
ALBUMIN SERPL BCP-MCNC: 3.8 G/DL (ref 3.4–5)
ALP SERPL-CCNC: 50 U/L (ref 33–136)
ALT SERPL W P-5'-P-CCNC: 12 U/L (ref 7–45)
ANION GAP SERPL CALCULATED.3IONS-SCNC: 10 MMOL/L (ref 10–20)
APPEARANCE UR: CLEAR
AST SERPL W P-5'-P-CCNC: 19 U/L (ref 9–39)
BASOPHILS # BLD AUTO: 0.02 X10*3/UL (ref 0–0.1)
BASOPHILS NFR BLD AUTO: 0.3 %
BILIRUB SERPL-MCNC: 0.4 MG/DL (ref 0–1.2)
BILIRUB UR STRIP.AUTO-MCNC: NEGATIVE MG/DL
BUN SERPL-MCNC: 16 MG/DL (ref 6–23)
CALCIUM SERPL-MCNC: 9.1 MG/DL (ref 8.6–10.3)
CAOX CRY #/AREA UR COMP ASSIST: ABNORMAL /HPF
CHLORIDE SERPL-SCNC: 104 MMOL/L (ref 98–107)
CO2 SERPL-SCNC: 27 MMOL/L (ref 21–32)
COLOR UR: YELLOW
CREAT SERPL-MCNC: 1 MG/DL (ref 0.5–1.05)
EGFRCR SERPLBLD CKD-EPI 2021: 53 ML/MIN/1.73M*2
EOSINOPHIL # BLD AUTO: 0.05 X10*3/UL (ref 0–0.4)
EOSINOPHIL NFR BLD AUTO: 0.8 %
ERYTHROCYTE [DISTWIDTH] IN BLOOD BY AUTOMATED COUNT: 12.5 % (ref 11.5–14.5)
GLUCOSE SERPL-MCNC: 112 MG/DL (ref 74–99)
GLUCOSE UR STRIP.AUTO-MCNC: NORMAL MG/DL
HCT VFR BLD AUTO: 36.6 % (ref 36–46)
HGB BLD-MCNC: 12.5 G/DL (ref 12–16)
HYALINE CASTS #/AREA URNS AUTO: ABNORMAL /LPF
IMM GRANULOCYTES # BLD AUTO: 0.01 X10*3/UL (ref 0–0.5)
IMM GRANULOCYTES NFR BLD AUTO: 0.2 % (ref 0–0.9)
KETONES UR STRIP.AUTO-MCNC: NEGATIVE MG/DL
LEUKOCYTE ESTERASE UR QL STRIP.AUTO: ABNORMAL
LIPASE SERPL-CCNC: 7 U/L (ref 9–82)
LYMPHOCYTES # BLD AUTO: 1.47 X10*3/UL (ref 0.8–3)
LYMPHOCYTES NFR BLD AUTO: 22.1 %
MCH RBC QN AUTO: 31.9 PG (ref 26–34)
MCHC RBC AUTO-ENTMCNC: 34.2 G/DL (ref 32–36)
MCV RBC AUTO: 93 FL (ref 80–100)
MONOCYTES # BLD AUTO: 0.7 X10*3/UL (ref 0.05–0.8)
MONOCYTES NFR BLD AUTO: 10.5 %
MUCOUS THREADS #/AREA URNS AUTO: ABNORMAL /LPF
NEUTROPHILS # BLD AUTO: 4.39 X10*3/UL (ref 1.6–5.5)
NEUTROPHILS NFR BLD AUTO: 66.1 %
NITRITE UR QL STRIP.AUTO: NEGATIVE
NRBC BLD-RTO: 0 /100 WBCS (ref 0–0)
PH UR STRIP.AUTO: 5.5 [PH]
PLATELET # BLD AUTO: 228 X10*3/UL (ref 150–450)
POTASSIUM SERPL-SCNC: 4 MMOL/L (ref 3.5–5.3)
PROT SERPL-MCNC: 6.3 G/DL (ref 6.4–8.2)
PROT UR STRIP.AUTO-MCNC: ABNORMAL MG/DL
RBC # BLD AUTO: 3.92 X10*6/UL (ref 4–5.2)
RBC # UR STRIP.AUTO: NEGATIVE MG/DL
RBC #/AREA URNS AUTO: ABNORMAL /HPF
SODIUM SERPL-SCNC: 137 MMOL/L (ref 136–145)
SP GR UR STRIP.AUTO: 1.03
UROBILINOGEN UR STRIP.AUTO-MCNC: NORMAL MG/DL
WBC # BLD AUTO: 6.6 X10*3/UL (ref 4.4–11.3)
WBC #/AREA URNS AUTO: ABNORMAL /HPF

## 2025-03-11 PROCEDURE — 87086 URINE CULTURE/COLONY COUNT: CPT | Mod: WESLAB | Performed by: NURSE PRACTITIONER

## 2025-03-11 PROCEDURE — 85025 COMPLETE CBC W/AUTO DIFF WBC: CPT | Performed by: NURSE PRACTITIONER

## 2025-03-11 PROCEDURE — 83690 ASSAY OF LIPASE: CPT | Performed by: NURSE PRACTITIONER

## 2025-03-11 PROCEDURE — 96374 THER/PROPH/DIAG INJ IV PUSH: CPT

## 2025-03-11 PROCEDURE — 99285 EMERGENCY DEPT VISIT HI MDM: CPT | Mod: 25

## 2025-03-11 PROCEDURE — 2550000001 HC RX 255 CONTRASTS: Performed by: NURSE PRACTITIONER

## 2025-03-11 PROCEDURE — 74177 CT ABD & PELVIS W/CONTRAST: CPT | Performed by: STUDENT IN AN ORGANIZED HEALTH CARE EDUCATION/TRAINING PROGRAM

## 2025-03-11 PROCEDURE — 36415 COLL VENOUS BLD VENIPUNCTURE: CPT | Performed by: NURSE PRACTITIONER

## 2025-03-11 PROCEDURE — 84075 ASSAY ALKALINE PHOSPHATASE: CPT | Performed by: NURSE PRACTITIONER

## 2025-03-11 PROCEDURE — 2500000004 HC RX 250 GENERAL PHARMACY W/ HCPCS (ALT 636 FOR OP/ED): Performed by: NURSE PRACTITIONER

## 2025-03-11 PROCEDURE — 74177 CT ABD & PELVIS W/CONTRAST: CPT

## 2025-03-11 PROCEDURE — 81001 URINALYSIS AUTO W/SCOPE: CPT | Performed by: NURSE PRACTITIONER

## 2025-03-11 RX ORDER — OMEPRAZOLE 20 MG/1
20 CAPSULE, DELAYED RELEASE ORAL DAILY
Qty: 30 CAPSULE | Refills: 0 | Status: SHIPPED | OUTPATIENT
Start: 2025-03-11 | End: 2025-04-10

## 2025-03-11 RX ORDER — FAMOTIDINE 10 MG/ML
20 INJECTION, SOLUTION INTRAVENOUS ONCE
Status: COMPLETED | OUTPATIENT
Start: 2025-03-11 | End: 2025-03-11

## 2025-03-11 RX ADMIN — FAMOTIDINE 20 MG: 10 INJECTION, SOLUTION INTRAVENOUS at 12:53

## 2025-03-11 RX ADMIN — IOHEXOL 75 ML: 350 INJECTION, SOLUTION INTRAVENOUS at 14:51

## 2025-03-11 ASSESSMENT — LIFESTYLE VARIABLES
HAVE YOU EVER FELT YOU SHOULD CUT DOWN ON YOUR DRINKING: NO
TOTAL SCORE: 0
EVER FELT BAD OR GUILTY ABOUT YOUR DRINKING: NO
EVER HAD A DRINK FIRST THING IN THE MORNING TO STEADY YOUR NERVES TO GET RID OF A HANGOVER: NO
HAVE PEOPLE ANNOYED YOU BY CRITICIZING YOUR DRINKING: NO

## 2025-03-11 ASSESSMENT — PAIN SCALES - GENERAL: PAINLEVEL_OUTOF10: 7

## 2025-03-11 ASSESSMENT — PAIN - FUNCTIONAL ASSESSMENT: PAIN_FUNCTIONAL_ASSESSMENT: 0-10

## 2025-03-11 ASSESSMENT — PAIN DESCRIPTION - LOCATION: LOCATION: ABDOMEN

## 2025-03-11 ASSESSMENT — COLUMBIA-SUICIDE SEVERITY RATING SCALE - C-SSRS
6. HAVE YOU EVER DONE ANYTHING, STARTED TO DO ANYTHING, OR PREPARED TO DO ANYTHING TO END YOUR LIFE?: NO
1. IN THE PAST MONTH, HAVE YOU WISHED YOU WERE DEAD OR WISHED YOU COULD GO TO SLEEP AND NOT WAKE UP?: NO
2. HAVE YOU ACTUALLY HAD ANY THOUGHTS OF KILLING YOURSELF?: NO

## 2025-03-11 NOTE — ED PROVIDER NOTES
HPI   Chief Complaint   Patient presents with    Black or Bloody Stool     Black stool x 3 days. Pt states she has stool passing through vagina. Pt has generalized weakness, loss of appetite. No nausea.        HPI  See my MDM      Patient History   Past Medical History:   Diagnosis Date    Arthritis     GERD (gastroesophageal reflux disease)     Hand fracture 01/17/2024    right thumb fracture    High blood pressure     High cholesterol      Past Surgical History:   Procedure Laterality Date    MR NECK ANGIO WO IV CONTRAST  10/17/2019    MR NECK ANGIO WO IV CONTRAST [facility]    TOTAL KNEE ARTHROPLASTY Bilateral      No family history on file.  Social History     Tobacco Use    Smoking status: Never     Passive exposure: Never    Smokeless tobacco: Never   Vaping Use    Vaping status: Never Used   Substance Use Topics    Alcohol use: Yes     Alcohol/week: 7.0 standard drinks of alcohol     Types: 7 Glasses of wine per week    Drug use: Never       Physical Exam   ED Triage Vitals [03/11/25 1213]   Temperature Heart Rate Respirations BP   36.9 °C (98.4 °F) 62 18 (!) 189/99      Pulse Ox Temp src Heart Rate Source Patient Position   98 % -- -- --      BP Location FiO2 (%)     -- --       Physical Exam  CONSTITUTIONAL: Vital signs reviewed as charted, well-developed and in no distress  Eyes: Extraocular muscles are intact. Pupils equal round and reactive to light. Conjunctiva are pink.    ENT: Mucous membranes are moist. Tongue in the midline. Pharynx was without erythema or exudates, uvula midline  LUNGS: Breath sounds equal and clear to auscultation. Good air exchange, no wheezes rales or retractions, pulse oximetry is charted.  HEART: Regular rate and rhythm without murmur thrill or rub, strong tones, auscultation is normal.  ABDOMEN: Soft and nontender without guarding rebound rigidity or mass. Bowel sounds are present and normal in all quadrants. There is no palpable masses or aneurysms identified. No  hepatosplenomegaly, normal abdominal exam.  Neuro: The patient is awake, alert and oriented ×3. Moving all 4 extremities and answering questions appropriately.   MUSCULOSKELETAL: The calves are nontender to palpation. Full gross active range of motion.   PSYCH: Awake alert oriented, normal mood and affect.  Skin:  Dry, normal color, warm to the touch, no rash present.        ED Course & MDM   ED Course as of 03/11/25 1709   e Mar 11, 2025   1317 I personally reviewed and interpreted the EKG @1226: Afib 67, no appreciable ischemia, LAD, LAFB, RBBB, non-specific TW findings, and no prior EKG available for review [BC]      ED Course User Index  [BC] Marko Mendieta MD         Diagnoses as of 03/11/25 1709   Diarrhea, unspecified type                 No data recorded     Sohail Coma Scale Score: 15 (03/11/25 1215 : Kaylee Kapoor RN)                           Medical Decision Making  History obtained from: patient    Vital signs, nursing notes, current medications, past medical history, Surgical history, allergies, social history, family History were reviewed.         HPI:  Patient 89-year-old female brought to the emergency room today by her daughter for evaluation of general malaise abdominal pain and dark tarry stools.  She reportedly has not been eating or drinking much over the last 3 or 4 days.  States she has developed some dark tarry stools last 3 bowel movements.  She has been having diarrhea.  Denies recent sick contacts or travel.  Denies heavy NSAID use.  Denies previous history of ulcers.  She is nontoxic and well-appearing vital signs are positive for hypertension.  Does take a baby aspirin daily.      10 point ROS was reviewed and negative except Noted above in HPI.  DDX: as listed above          MDM Summary/considerations:  EMERGENCY DEPARTMENT COURSE and DIFFERENTIAL DIAGNOSIS/MDM:    The patient presented with a chief complaint of general malaise, anorexia, dark tarry diarrhea. The differential  "diagnosis associated with this patient's presentation includes upper GI bleed, gastritis, ulcer.     Vitals:    Vitals:    03/11/25 1213 03/11/25 1627   BP: (!) 189/99 (!) 216/66   Pulse: 62 62   Resp: 18 18   Temp: 36.9 °C (98.4 °F)    SpO2: 98% 96%   Weight: 61.7 kg (136 lb)    Height: 1.575 m (5' 2\")        ED Course as of 03/11/25 1709   Tue Mar 11, 2025   1317 I personally reviewed and interpreted the EKG @1226: Afib 67, no appreciable ischemia, LAD, LAFB, RBBB, non-specific TW findings, and no prior EKG available for review [BC]      ED Course User Index  [BC] Marko Mendieta MD         Diagnoses as of 03/11/25 1709   Diarrhea, unspecified type       History Limited by:    None    Independent history obtained from:    None    External records reviewed:    None    Diagnostics interpreted by me:    EKG interpreted by my attending physician and CT Scan(s) abdomen and pelvis    Discussions with other clinicians:    None    Chronic conditions impacting care:    None    Social determinants of health affecting care:    None    Diagnostic tests considered but not performed: none    ED Medications managed:    Medications   famotidine PF (Pepcid) injection 20 mg (20 mg intravenous Given 3/11/25 1253)   iohexol (OMNIPaque) 350 mg iodine/mL solution 75 mL (75 mL intravenous Given 3/11/25 1451)       Prescription drugs considered:     Omeprazole    Screenings:          Labs Reviewed   LIPASE - Abnormal       Result Value    Lipase 7 (*)     Narrative:     Venipuncture immediately after or during the administration of Metamizole may lead to falsely low results. Testing should be performed immediately prior to Metamizole dosing.   COMPREHENSIVE METABOLIC PANEL - Abnormal    Glucose 112 (*)     Sodium 137      Potassium 4.0      Chloride 104      Bicarbonate 27      Anion Gap 10      Urea Nitrogen 16      Creatinine 1.00      eGFR 53 (*)     Calcium 9.1      Albumin 3.8      Alkaline Phosphatase 50      Total Protein 6.3 " (*)     AST 19      Bilirubin, Total 0.4      ALT 12     CBC WITH AUTO DIFFERENTIAL - Abnormal    WBC 6.6      nRBC 0.0      RBC 3.92 (*)     Hemoglobin 12.5      Hematocrit 36.6      MCV 93      MCH 31.9      MCHC 34.2      RDW 12.5      Platelets 228      Neutrophils % 66.1      Immature Granulocytes %, Automated 0.2      Lymphocytes % 22.1      Monocytes % 10.5      Eosinophils % 0.8      Basophils % 0.3      Neutrophils Absolute 4.39      Immature Granulocytes Absolute, Automated 0.01      Lymphocytes Absolute 1.47      Monocytes Absolute 0.70      Eosinophils Absolute 0.05      Basophils Absolute 0.02     URINALYSIS WITH REFLEX CULTURE AND MICROSCOPIC - Abnormal    Color, Urine Yellow      Appearance, Urine Clear      Specific Gravity, Urine 1.027      pH, Urine 5.5      Protein, Urine 30 (1+) (*)     Glucose, Urine Normal      Blood, Urine NEGATIVE      Ketones, Urine NEGATIVE      Bilirubin, Urine NEGATIVE      Urobilinogen, Urine Normal      Nitrite, Urine NEGATIVE      Leukocyte Esterase, Urine 75 Sultana/uL (*)    MICROSCOPIC ONLY, URINE - Abnormal    WBC, Urine 11-20 (*)     RBC, Urine NONE      Mucus, Urine FEW      Hyaline Casts, Urine 2+ (*)     Calcium Oxalate Crystals, Urine 1+     URINE CULTURE   URINALYSIS WITH REFLEX CULTURE AND MICROSCOPIC    Narrative:     The following orders were created for panel order Urinalysis with Reflex Culture and Microscopic.  Procedure                               Abnormality         Status                     ---------                               -----------         ------                     Urinalysis with Reflex C...[[id number]]  Abnormal            Final result               Extra Urine Gray Tube[[id number]]                            In process                   Please view results for these tests on the individual orders.   EXTRA URINE GRAY TUBE     CT abdomen pelvis w IV contrast   Final Result   1.  No acute findings in the abdomen and pelvis.   2. Colonic  diverticulosis without diverticulitis.   3. Mild intrahepatic common bile duct dilation, without visualized   obstructing calculus or lesion.             Signed by: Hayder Yanez 3/11/2025 3:20 PM   Dictation workstation:   [id number]        Medications   famotidine PF (Pepcid) injection 20 mg (20 mg intravenous Given 3/11/25 1253)   iohexol (OMNIPaque) 350 mg iodine/mL solution 75 mL (75 mL intravenous Given 3/11/25 1042)     Discharge Medication List as of 3/11/2025  4:26 PM        START taking these medications    Details   omeprazole (PriLOSEC) 20 mg DR capsule Take 1 capsule (20 mg) by mouth once daily. Do not crush or chew., Starting Tue 3/11/2025, Until Thu 4/10/2025, Normal           I estimate there is a low risk for acute appendicitis, bowel extraction, cystitis, diverticulitis, incarcerated hernia, mesenteric ischemia, pancreatitis, or perforated bowel or ulcer, thus I considered the discharge disposition reasonable. There is no evidence of peritonitis, sepsis, or toxicity. I have discussed the diagnosis and risks, and we agreed with discharging home to follow-up with the primary care doctor. We also discussed returning to the emergency department immediately if new or worsening symptoms occur. Symptoms of most concern that we discussed are bloody stool, fever, changing or worsening pain, or vomiting that necessitates immediate return.      Patient will be started on omeprazole, discharged home stable condition CT scan showed no gross abnormality.  Laboratory assessment showed a stable hemoglobin.  Was given GI referral.  Will follow with her PCP 1 to 2 days for reevaluation.    All of the patient's questions were answered to the best of my ability.  Patient states understanding that they have been screened for an emergency today and we have not found any etiology of symptoms that requires emergent treatment or admission to the hospital at this point. They understand that they have not had definitive  care day and require follow-up for treatment of their condition. They also state understanding that they may have an emergent condition that may potentially have not of detected at this visit and they must return to the emergency department if they develop any worsening of symptoms or new complaints.      Discussed H&P with supervising physician, aware of results and agrees with plan/ disposition.          Critical Care: Not warranted at this time        This chart was completed using voice recognition transcription software. Please excuse any errors of transcription including grammatical, punctuation, syntax and spelling errors.  Please contact me with any questions regarding this chart.    Procedure  Procedures     LOREE Perkins-PK  03/11/25 2978

## 2025-03-12 LAB
BACTERIA UR CULT: NORMAL
HOLD SPECIMEN: NORMAL

## 2025-04-28 LAB
ALBUMIN SERPL-MCNC: 4.1 G/DL (ref 3.6–5.1)
ALP SERPL-CCNC: 62 U/L (ref 37–153)
ALT SERPL-CCNC: 13 U/L (ref 6–29)
ANION GAP SERPL CALCULATED.4IONS-SCNC: 9 MMOL/L (CALC) (ref 7–17)
AST SERPL-CCNC: 18 U/L (ref 10–35)
BASOPHILS # BLD AUTO: 26 CELLS/UL (ref 0–200)
BASOPHILS NFR BLD AUTO: 0.3 %
BILIRUB SERPL-MCNC: 0.5 MG/DL (ref 0.2–1.2)
BUN SERPL-MCNC: 21 MG/DL (ref 7–25)
CALCIUM SERPL-MCNC: 9.6 MG/DL (ref 8.6–10.4)
CHLORIDE SERPL-SCNC: 102 MMOL/L (ref 98–110)
CO2 SERPL-SCNC: 28 MMOL/L (ref 20–32)
CREAT SERPL-MCNC: 0.84 MG/DL (ref 0.6–0.95)
EGFRCR SERPLBLD CKD-EPI 2021: 65 ML/MIN/1.73M2
EOSINOPHIL # BLD AUTO: 282 CELLS/UL (ref 15–500)
EOSINOPHIL NFR BLD AUTO: 3.2 %
ERYTHROCYTE [DISTWIDTH] IN BLOOD BY AUTOMATED COUNT: 12.1 % (ref 11–15)
GLUCOSE SERPL-MCNC: 126 MG/DL (ref 65–99)
HCT VFR BLD AUTO: 38.1 % (ref 35–45)
HGB BLD-MCNC: 12.5 G/DL (ref 11.7–15.5)
LYMPHOCYTES # BLD AUTO: 3388 CELLS/UL (ref 850–3900)
LYMPHOCYTES NFR BLD AUTO: 38.5 %
MCH RBC QN AUTO: 31.7 PG (ref 27–33)
MCHC RBC AUTO-ENTMCNC: 32.8 G/DL (ref 32–36)
MCV RBC AUTO: 96.7 FL (ref 80–100)
MONOCYTES # BLD AUTO: 730 CELLS/UL (ref 200–950)
MONOCYTES NFR BLD AUTO: 8.3 %
NEUTROPHILS # BLD AUTO: 4374 CELLS/UL (ref 1500–7800)
NEUTROPHILS NFR BLD AUTO: 49.7 %
PLATELET # BLD AUTO: 244 THOUSAND/UL (ref 140–400)
PMV BLD REES-ECKER: 10.3 FL (ref 7.5–12.5)
POTASSIUM SERPL-SCNC: 4.8 MMOL/L (ref 3.5–5.3)
PROT SERPL-MCNC: 6.5 G/DL (ref 6.1–8.1)
RBC # BLD AUTO: 3.94 MILLION/UL (ref 3.8–5.1)
SODIUM SERPL-SCNC: 139 MMOL/L (ref 135–146)
WBC # BLD AUTO: 8.8 THOUSAND/UL (ref 3.8–10.8)

## 2025-04-29 DIAGNOSIS — I10 PRIMARY HYPERTENSION: ICD-10-CM

## 2025-04-30 ENCOUNTER — OFFICE VISIT (OUTPATIENT)
Dept: PRIMARY CARE | Facility: CLINIC | Age: OVER 89
End: 2025-04-30
Payer: MEDICARE

## 2025-04-30 VITALS
DIASTOLIC BLOOD PRESSURE: 80 MMHG | SYSTOLIC BLOOD PRESSURE: 132 MMHG | WEIGHT: 137 LBS | BODY MASS INDEX: 25.21 KG/M2 | HEIGHT: 62 IN

## 2025-04-30 DIAGNOSIS — I67.9 CEREBROVASCULAR DISEASE: ICD-10-CM

## 2025-04-30 DIAGNOSIS — R73.03 PREDIABETES: ICD-10-CM

## 2025-04-30 DIAGNOSIS — Z23 ENCOUNTER FOR IMMUNIZATION: ICD-10-CM

## 2025-04-30 DIAGNOSIS — K58.9 IRRITABLE BOWEL SYNDROME WITHOUT DIARRHEA: ICD-10-CM

## 2025-04-30 DIAGNOSIS — K21.9 GASTROESOPHAGEAL REFLUX DISEASE WITHOUT ESOPHAGITIS: ICD-10-CM

## 2025-04-30 DIAGNOSIS — D64.9 ANEMIA, UNSPECIFIED TYPE: ICD-10-CM

## 2025-04-30 DIAGNOSIS — Z79.899 POLYPHARMACY: ICD-10-CM

## 2025-04-30 DIAGNOSIS — E78.2 MIXED HYPERLIPIDEMIA: ICD-10-CM

## 2025-04-30 DIAGNOSIS — E53.8 VITAMIN B12 DEFICIENCY: ICD-10-CM

## 2025-04-30 DIAGNOSIS — E55.9 VITAMIN D DEFICIENCY: ICD-10-CM

## 2025-04-30 DIAGNOSIS — F41.9 ANXIETY: ICD-10-CM

## 2025-04-30 DIAGNOSIS — M15.0 PRIMARY OSTEOARTHRITIS INVOLVING MULTIPLE JOINTS: ICD-10-CM

## 2025-04-30 DIAGNOSIS — Z86.73 HISTORY OF STROKE: ICD-10-CM

## 2025-04-30 DIAGNOSIS — Z00.00 ANNUAL PHYSICAL EXAM: Primary | ICD-10-CM

## 2025-04-30 DIAGNOSIS — Z01.89 ENCOUNTER FOR ROUTINE LABORATORY TESTING: ICD-10-CM

## 2025-04-30 DIAGNOSIS — Z71.89 COUNSELING REGARDING ADVANCED CARE PLANNING AND GOALS OF CARE: ICD-10-CM

## 2025-04-30 DIAGNOSIS — I10 PRIMARY HYPERTENSION: ICD-10-CM

## 2025-04-30 PROCEDURE — 99214 OFFICE O/P EST MOD 30 MIN: CPT | Performed by: STUDENT IN AN ORGANIZED HEALTH CARE EDUCATION/TRAINING PROGRAM

## 2025-04-30 PROCEDURE — 1170F FXNL STATUS ASSESSED: CPT | Performed by: STUDENT IN AN ORGANIZED HEALTH CARE EDUCATION/TRAINING PROGRAM

## 2025-04-30 PROCEDURE — 3075F SYST BP GE 130 - 139MM HG: CPT | Performed by: STUDENT IN AN ORGANIZED HEALTH CARE EDUCATION/TRAINING PROGRAM

## 2025-04-30 PROCEDURE — 1160F RVW MEDS BY RX/DR IN RCRD: CPT | Performed by: STUDENT IN AN ORGANIZED HEALTH CARE EDUCATION/TRAINING PROGRAM

## 2025-04-30 PROCEDURE — G2211 COMPLEX E/M VISIT ADD ON: HCPCS | Performed by: STUDENT IN AN ORGANIZED HEALTH CARE EDUCATION/TRAINING PROGRAM

## 2025-04-30 PROCEDURE — G0439 PPPS, SUBSEQ VISIT: HCPCS | Performed by: STUDENT IN AN ORGANIZED HEALTH CARE EDUCATION/TRAINING PROGRAM

## 2025-04-30 PROCEDURE — 1126F AMNT PAIN NOTED NONE PRSNT: CPT | Performed by: STUDENT IN AN ORGANIZED HEALTH CARE EDUCATION/TRAINING PROGRAM

## 2025-04-30 PROCEDURE — 1159F MED LIST DOCD IN RCRD: CPT | Performed by: STUDENT IN AN ORGANIZED HEALTH CARE EDUCATION/TRAINING PROGRAM

## 2025-04-30 PROCEDURE — 99215 OFFICE O/P EST HI 40 MIN: CPT | Mod: 25 | Performed by: STUDENT IN AN ORGANIZED HEALTH CARE EDUCATION/TRAINING PROGRAM

## 2025-04-30 PROCEDURE — 1036F TOBACCO NON-USER: CPT | Performed by: STUDENT IN AN ORGANIZED HEALTH CARE EDUCATION/TRAINING PROGRAM

## 2025-04-30 PROCEDURE — 1157F ADVNC CARE PLAN IN RCRD: CPT | Performed by: STUDENT IN AN ORGANIZED HEALTH CARE EDUCATION/TRAINING PROGRAM

## 2025-04-30 PROCEDURE — 3079F DIAST BP 80-89 MM HG: CPT | Performed by: STUDENT IN AN ORGANIZED HEALTH CARE EDUCATION/TRAINING PROGRAM

## 2025-04-30 RX ORDER — LORAZEPAM 1 MG/1
1 TABLET ORAL 2 TIMES DAILY PRN
Qty: 30 TABLET | Refills: 0 | Status: SHIPPED | OUTPATIENT
Start: 2025-04-30

## 2025-04-30 ASSESSMENT — ACTIVITIES OF DAILY LIVING (ADL)
MANAGING_FINANCES: INDEPENDENT
DOING_HOUSEWORK: INDEPENDENT
TAKING_MEDICATION: INDEPENDENT
GROCERY_SHOPPING: INDEPENDENT
BATHING: INDEPENDENT
DRESSING: INDEPENDENT

## 2025-04-30 ASSESSMENT — PATIENT HEALTH QUESTIONNAIRE - PHQ9
SUM OF ALL RESPONSES TO PHQ9 QUESTIONS 1 AND 2: 0
1. LITTLE INTEREST OR PLEASURE IN DOING THINGS: NOT AT ALL
2. FEELING DOWN, DEPRESSED OR HOPELESS: NOT AT ALL

## 2025-04-30 ASSESSMENT — ENCOUNTER SYMPTOMS
HEMATOLOGIC/LYMPHATIC NEGATIVE: 1
NEUROLOGICAL NEGATIVE: 1
PSYCHIATRIC NEGATIVE: 1
ALLERGIC/IMMUNOLOGIC NEGATIVE: 1
GASTROINTESTINAL NEGATIVE: 1
RESPIRATORY NEGATIVE: 1
CONSTITUTIONAL NEGATIVE: 1
CARDIOVASCULAR NEGATIVE: 1
ENDOCRINE NEGATIVE: 1
MUSCULOSKELETAL NEGATIVE: 1
EYES NEGATIVE: 1

## 2025-04-30 ASSESSMENT — PAIN SCALES - GENERAL: PAINLEVEL_OUTOF10: 0-NO PAIN

## 2025-04-30 NOTE — PROGRESS NOTES
[facility]: [facility]  MEDICARE WELLNESS EXAM      Claudine Jones is a 89 y.o. female that is presenting today for Annual Exam.    Assessment/Plan    - Overall, the patient feels well and denies any acute symptoms / concerns at this time.  - Blood pressure at goal today.  - Encouraged continued dietary, exercise, and lifestyle modification.  - Significant medication and problem list reconciliation done today.   - Today's appointment is to keep the patient in compliance with their controlled substance agreement (CSA).   - The patient needs to sign a new CSA since the previous one has . Discussed with the patient that they would be provided a paper copy and that they could also find this on their MyChart. Patient voiced understanding and agreement.  - The patient has completed their urine drug screen (UDS) this year.  - I have considered the risks of abuse, dependence, addiction, and/or diversion and have discussed these with the patient during our appointment.  - I do believe that the medication(s) are medically necessary. Patient has tried multiple alternatives to controlled substances in the past with limited success.  - The patient's symptoms are well-controlled on current therapy.  - PDMP reviewed and appropriate.    Discussed routine and/or preventative care with the patient as outlined below:  - Labwork:   - Patient had labwork done for this appointment. Discussed today. Everything looked great.  - Patient has already had labs ordered for their next appointment. Encouraged the patient to get this labwork done one week prior to the next appointment.  - Imaging:   - Patient does not appear to be due for routine imaging at this time.  - Immunizations:   - Encouraged the patient to get their shingles (shingrix) immunizations.  - Discussed the RSV immunization.     ADVANCED CARE PLANNING  Advanced Care Planning was discussed with patient.  Encouraged the patient to confirm that  Living Will and Healthcare Power of  (HCPoA) are accurate and up to date.  Encouraged the patient to confirm that our office be provided a copy of any documentation in the event that anything changes.    Diagnoses and all orders for this visit:  Annual physical exam  -     Follow Up In Primary Care  -     Follow Up In Primary Care; Future  Counseling regarding advanced care planning and goals of care  Encounter for routine laboratory testing  Encounter for immunization  Polypharmacy  -     Follow Up In Primary Care; Future  -     Follow Up In Primary Care; Future  -     Follow Up In Primary Care; Future  -     LORazepam (Ativan) 1 mg tablet; Take 1 tablet (1 mg) by mouth 2 times a day as needed for anxiety.  Gastroesophageal reflux disease without esophagitis  -     Follow Up In Primary Care; Future  Primary osteoarthritis involving multiple joints  -     Follow Up In Primary Care; Future  Irritable bowel syndrome without diarrhea  -     Follow Up In Primary Care; Future  Anemia, unspecified type  -     Follow Up In Primary Care; Future  Cerebrovascular disease  -     Follow Up In Primary Care; Future  Vitamin B12 deficiency  -     Follow Up In Primary Care; Future  Mixed hyperlipidemia  -     Follow Up In Primary Care; Future  Primary hypertension  -     Follow Up In Primary Care; Future  -     Follow Up In Primary Care; Future  -     Follow Up In Primary Care; Future  Vitamin D deficiency  -     Follow Up In Primary Care; Future  History of stroke  -     Follow Up In Primary Care; Future  Prediabetes  -     Follow Up In Primary Care; Future  Anxiety  -     Follow Up In Primary Care; Future  -     Follow Up In Primary Care; Future  -     Follow Up In Primary Care; Future  -     LORazepam (Ativan) 1 mg tablet; Take 1 tablet (1 mg) by mouth 2 times a day as needed for anxiety.    Current Outpatient Medications   Medication Instructions    acetaminophen (TYLENOL 8 HOUR) 650 mg, Every 8 hours PRN    aspirin 81  mg, oral, Daily    atorvastatin (LIPITOR) 20 mg, oral, Daily    famotidine (PEPCID) 20 mg, oral, Nightly PRN    ferrous sulfate 325 mg, Daily    lisinopril 20 mg, oral, Daily    loperamide-simethicone 2-125 mg tablet per tablet 1 capsule, oral, 2 times daily (morning and midday)    loratadine (CLARITIN) 10 mg, oral, Daily PRN    LORazepam (ATIVAN) 1 mg, oral, 2 times daily PRN    melatonin 10 mg, Daily RT    multivitamin tablet 1 tablet, oral, Daily    omega-3 (Fish Oil) 60- mg capsule 1,000 mg, oral, Daily    omeprazole (PRILOSEC) 20 mg, oral, Daily, Do not crush or chew.     Subjective   - The patient otherwise feels well and denies any acute symptoms or concerns at this time.  - The patient denies any changes or progression of their chronic medical problems.  - The patient denies any problems or concerns with their medications.      Review of Systems   Constitutional: Negative.    HENT: Negative.     Eyes: Negative.    Respiratory: Negative.     Cardiovascular: Negative.    Gastrointestinal: Negative.    Endocrine: Negative.    Genitourinary: Negative.    Musculoskeletal: Negative.    Skin: Negative.    Allergic/Immunologic: Negative.    Neurological: Negative.    Hematological: Negative.    Psychiatric/Behavioral: Negative.     All other systems reviewed and are negative.    Objective   Vitals:    04/30/25 1031   BP: 132/80      Body mass index is 25.05 kg/m².  Physical Exam  Vitals and nursing note reviewed.   Constitutional:       General: She is not in acute distress.     Appearance: Normal appearance. She is not ill-appearing.   HENT:      Head: Normocephalic and atraumatic.      Right Ear: Tympanic membrane, ear canal and external ear normal. There is no impacted cerumen.      Left Ear: Tympanic membrane, ear canal and external ear normal. There is no impacted cerumen.      Nose: Nose normal.      Mouth/Throat:      Mouth: Mucous membranes are moist.      Pharynx: Oropharynx is clear. No oropharyngeal  exudate or posterior oropharyngeal erythema.   Eyes:      General: No scleral icterus.        Right eye: No discharge.         Left eye: No discharge.      Extraocular Movements: Extraocular movements intact.      Conjunctiva/sclera: Conjunctivae normal.      Pupils: Pupils are equal, round, and reactive to light.   Neck:      Vascular: No carotid bruit.   Cardiovascular:      Rate and Rhythm: Normal rate and regular rhythm.      Pulses: Normal pulses.      Heart sounds: Normal heart sounds. No murmur heard.     No friction rub. No gallop.   Pulmonary:      Effort: Pulmonary effort is normal. No respiratory distress.      Breath sounds: Normal breath sounds.   Abdominal:      General: Abdomen is flat. Bowel sounds are normal. There is no distension.      Palpations: Abdomen is soft.      Tenderness: There is no abdominal tenderness.      Hernia: No hernia is present.   Musculoskeletal:         General: No swelling or tenderness. Normal range of motion.   Lymphadenopathy:      Cervical: No cervical adenopathy.   Skin:     General: Skin is warm and dry.      Capillary Refill: Capillary refill takes less than 2 seconds.      Coloration: Skin is not jaundiced.      Findings: No rash.   Neurological:      General: No focal deficit present.      Mental Status: She is alert and oriented to person, place, and time. Mental status is at baseline.   Psychiatric:         Mood and Affect: Mood normal.         Behavior: Behavior normal.       Diagnostic Results   Lab Results   Component Value Date    GLUCOSE 126 (H) 04/28/2025    CALCIUM 9.6 04/28/2025     04/28/2025    K 4.8 04/28/2025    CO2 28 04/28/2025     04/28/2025    BUN 21 04/28/2025    CREATININE 0.84 04/28/2025     Lab Results   Component Value Date    ALT 13 04/28/2025    AST 18 04/28/2025    ALKPHOS 62 04/28/2025    BILITOT 0.5 04/28/2025     Lab Results   Component Value Date    WBC 8.8 04/28/2025    HGB 12.5 04/28/2025    HCT 38.1 04/28/2025    MCV 96.7  "04/28/2025     04/28/2025     Lab Results   Component Value Date    CHOL 126 10/22/2024    CHOL 115 (L) 04/04/2023    CHOL 145 04/28/2022     Lab Results   Component Value Date    HDL 54.2 10/22/2024    HDL 59 04/04/2023    HDL 56 04/28/2022     Lab Results   Component Value Date    LDLCALC 53 10/22/2024    LDLCALC 41 (L) 04/04/2023    LDLCALC 68 04/28/2022     Lab Results   Component Value Date    TRIG 92 10/22/2024    TRIG 73 04/04/2023    TRIG 107 04/28/2022     No components found for: \"CHOLHDL\"  Lab Results   Component Value Date    HGBA1C 5.7 (H) 10/22/2024     Other labs not included in the list above reviewed either before or during this encounter.    History   Medical History[1]  Surgical History[2]  Family History[3]  Social History     Socioeconomic History    Marital status:      Spouse name: Not on file    Number of children: Not on file    Years of education: Not on file    Highest education level: Not on file   Occupational History    Not on file   Tobacco Use    Smoking status: Never     Passive exposure: Never    Smokeless tobacco: Never   Vaping Use    Vaping status: Never Used   Substance and Sexual Activity    Alcohol use: Yes     Alcohol/week: 7.0 standard drinks of alcohol     Types: 7 Glasses of wine per week    Drug use: Never    Sexual activity: Defer   Other Topics Concern    Not on file   Social History Narrative    ** Merged History Encounter **         ** Merged History Encounter **          Social Drivers of Health     Financial Resource Strain: Not on file   Food Insecurity: Not on file   Transportation Needs: Not on file   Physical Activity: Not on file   Stress: Not on file   Social Connections: Not on file   Intimate Partner Violence: Not on file   Housing Stability: Not on file     Allergies[4]  Medications Ordered Prior to Encounter[5]  Immunization History   Administered Date(s) Administered    Flu vaccine, trivalent, preservative free, HIGH-DOSE, age 65y+ " (Fluzone) 10/23/2024    Moderna COVID-19 vaccine, bivalent, blue cap/gray label *Check age/dose* 11/22/2022    Moderna SARS-CoV-2 Vaccination 02/05/2021, 03/05/2021, 10/22/2021, 04/06/2022    Pneumococcal conjugate vaccine, 13-valent (PREVNAR 13) 02/28/2017    Pneumococcal polysaccharide vaccine, 23-valent, age 2 years and older (PNEUMOVAX 23) 12/28/2018    Tdap vaccine, age 7 year and older (BOOSTRIX, ADACEL) 01/17/2024     Patient's medical history was reviewed and updated either before or during this encounter.     Cash Palencia MD         [1]   Past Medical History:  Diagnosis Date    Arthritis     GERD (gastroesophageal reflux disease)     Hand fracture 01/17/2024    right thumb fracture    High blood pressure     High cholesterol    [2]   Past Surgical History:  Procedure Laterality Date    MR NECK ANGIO WO IV CONTRAST  10/17/2019    MR NECK ANGIO WO IV CONTRAST [facility]    TOTAL KNEE ARTHROPLASTY Bilateral    [3] No family history on file.  [4] No Known Allergies  [5]   Current Outpatient Medications on File Prior to Visit   Medication Sig Dispense Refill    acetaminophen (Tylenol 8 HOUR) 650 mg ER tablet Take 1 tablet (650 mg) by mouth every 8 hours if needed for mild pain (1 - 3). Do not crush, chew, or split.      aspirin 81 mg EC tablet Take 1 tablet (81 mg) by mouth once daily.      atorvastatin (Lipitor) 20 mg tablet Take 1 tablet (20 mg) by mouth once daily. 90 tablet 3    famotidine (Pepcid) 20 mg tablet Take 1 tablet (20 mg) by mouth as needed at bedtime for heartburn or indigestion.      ferrous sulfate 325 (65 Fe) MG EC tablet Take 1 tablet by mouth once daily. Do not crush, chew, or split.      lisinopril 20 mg tablet Take 1 tablet (20 mg) by mouth once daily. 90 tablet 3    loperamide-simethicone 2-125 mg tablet per tablet Take 1 capsule by mouth 2 times daily (morning and midday).      loratadine (Claritin) 10 mg tablet Take 1 tablet (10 mg) by mouth once daily as needed for  allergies.      melatonin 10 mg tablet,disintegrating Take 10 mg by mouth once daily.      multivitamin tablet Take 1 tablet by mouth once daily.      omega-3 (Fish Oil) 60- mg capsule Take 2 capsules (1,000 mg) by mouth once daily.      omeprazole (PriLOSEC) 20 mg DR capsule Take 1 capsule (20 mg) by mouth once daily. Do not crush or chew. 30 capsule 0    [DISCONTINUED] LORazepam (Ativan) 1 mg tablet Take 1 tablet (1 mg) by mouth 2 times a day as needed for anxiety. 30 tablet 2    [DISCONTINUED] fluconazole (Diflucan) 150 mg tablet Take 1 tablet (150 mg) by mouth if needed. (Patient not taking: Reported on 4/30/2025)       No current facility-administered medications on file prior to visit.

## 2025-04-30 NOTE — PATIENT INSTRUCTIONS
- Overall, the patient feels well and denies any acute symptoms / concerns at this time.  - Blood pressure at goal today.  - Encouraged continued dietary, exercise, and lifestyle modification.  - Significant medication and problem list reconciliation done today.   - Today's appointment is to keep the patient in compliance with their controlled substance agreement (CSA).   - The patient needs to sign a new CSA since the previous one has . Discussed with the patient that they would be provided a paper copy and that they could also find this on their MyChart. Patient voiced understanding and agreement.  - The patient has completed their urine drug screen (UDS) this year.  - I have considered the risks of abuse, dependence, addiction, and/or diversion and have discussed these with the patient during our appointment.  - I do believe that the medication(s) are medically necessary. Patient has tried multiple alternatives to controlled substances in the past with limited success.  - The patient's symptoms are well-controlled on current therapy.  - PDMP reviewed and appropriate.    Discussed routine and/or preventative care with the patient as outlined below:  - Labwork:   - Patient had labwork done for this appointment. Discussed today. Everything looked great.  - Patient has already had labs ordered for their next appointment. Encouraged the patient to get this labwork done one week prior to the next appointment.  - Imaging:   - Patient does not appear to be due for routine imaging at this time.  - Immunizations:   - Encouraged the patient to get their shingles (shingrix) immunizations.  - Discussed the RSV immunization.     ADVANCED CARE PLANNING  Advanced Care Planning was discussed with patient.  Encouraged the patient to confirm that Living Will and Healthcare Power of  (HCPoA) are accurate and up to date.  Encouraged the patient to confirm that our office be provided a copy of any documentation in the  event that anything changes.

## 2025-05-13 DIAGNOSIS — I10 PRIMARY HYPERTENSION: ICD-10-CM

## 2025-05-13 DIAGNOSIS — E78.2 MIXED HYPERLIPIDEMIA: ICD-10-CM

## 2025-05-13 RX ORDER — LISINOPRIL 20 MG/1
20 TABLET ORAL DAILY
Qty: 90 TABLET | Refills: 3 | Status: SHIPPED | OUTPATIENT
Start: 2025-05-13 | End: 2026-05-13

## 2025-05-13 RX ORDER — ATORVASTATIN CALCIUM 20 MG/1
20 TABLET, FILM COATED ORAL DAILY
Qty: 90 TABLET | Refills: 3 | Status: SHIPPED | OUTPATIENT
Start: 2025-05-13 | End: 2026-05-13

## 2025-07-31 LAB
1OH-MIDAZOLAM UR-MCNC: NEGATIVE NG/ML
7AMINOCLONAZEPAM UR-MCNC: NEGATIVE NG/ML
A-OH ALPRAZ UR-MCNC: NEGATIVE NG/ML
A-OH-TRIAZOLAM UR-MCNC: NEGATIVE NG/ML
AMPHETAMINES UR QL: NEGATIVE NG/ML
BARBITURATES UR QL: NEGATIVE NG/ML
BZE UR QL: NEGATIVE NG/ML
CODEINE UR-MCNC: NEGATIVE NG/ML
CREAT UR-MCNC: 26.1 MG/DL
DRUG SCREEN COMMENT UR-IMP: ABNORMAL
EDDP UR-MCNC: NEGATIVE NG/ML
FENTANYL UR-MCNC: NEGATIVE NG/ML
HYDROCODONE UR-MCNC: NEGATIVE NG/ML
HYDROMORPHONE UR-MCNC: NEGATIVE NG/ML
LORAZEPAM UR-MCNC: 322 NG/ML
METHADONE UR-MCNC: NEGATIVE NG/ML
MORPHINE UR-MCNC: NEGATIVE NG/ML
NORDIAZEPAM UR-MCNC: NEGATIVE NG/ML
NORFENTANYL UR-MCNC: NEGATIVE NG/ML
NORHYDROCODONE UR CFM-MCNC: NEGATIVE NG/ML
NOROXYCODONE UR CFM-MCNC: NEGATIVE NG/ML
NORTRAMADOL UR-MCNC: NEGATIVE NG/ML
OH-ETHYLFLURAZ UR-MCNC: NEGATIVE NG/ML
OXAZEPAM UR-MCNC: NEGATIVE NG/ML
OXIDANTS UR QL: NEGATIVE MCG/ML
OXYCODONE UR CFM-MCNC: NEGATIVE NG/ML
OXYMORPHONE UR CFM-MCNC: NEGATIVE NG/ML
PCP UR QL: NEGATIVE NG/ML
PH UR: 6 [PH] (ref 4.5–9)
QUEST 6 ACETYLMORPHINE: NEGATIVE NG/ML
QUEST NOTES AND COMMENTS: ABNORMAL
QUEST ZOLPIDEM: NEGATIVE NG/ML
TEMAZEPAM UR-MCNC: NEGATIVE NG/ML
THC UR QL: NEGATIVE NG/ML
TRAMADOL UR-MCNC: NEGATIVE NG/ML
ZOLPIDEM PHENYL-4-CARB UR CFM-MCNC: NEGATIVE NG/ML

## 2025-08-06 ENCOUNTER — OFFICE VISIT (OUTPATIENT)
Dept: PRIMARY CARE | Facility: CLINIC | Age: OVER 89
End: 2025-08-06
Payer: MEDICARE

## 2025-08-06 VITALS
HEIGHT: 62 IN | DIASTOLIC BLOOD PRESSURE: 60 MMHG | WEIGHT: 131 LBS | BODY MASS INDEX: 24.11 KG/M2 | OXYGEN SATURATION: 95 % | SYSTOLIC BLOOD PRESSURE: 122 MMHG | HEART RATE: 42 BPM

## 2025-08-06 DIAGNOSIS — R73.03 PREDIABETES: ICD-10-CM

## 2025-08-06 DIAGNOSIS — E78.2 MIXED HYPERLIPIDEMIA: ICD-10-CM

## 2025-08-06 DIAGNOSIS — D64.9 ANEMIA, UNSPECIFIED TYPE: ICD-10-CM

## 2025-08-06 DIAGNOSIS — E55.9 VITAMIN D DEFICIENCY: ICD-10-CM

## 2025-08-06 DIAGNOSIS — I10 PRIMARY HYPERTENSION: ICD-10-CM

## 2025-08-06 DIAGNOSIS — F41.9 ANXIETY: ICD-10-CM

## 2025-08-06 DIAGNOSIS — Z79.899 POLYPHARMACY: Primary | ICD-10-CM

## 2025-08-06 PROCEDURE — 1036F TOBACCO NON-USER: CPT | Performed by: STUDENT IN AN ORGANIZED HEALTH CARE EDUCATION/TRAINING PROGRAM

## 2025-08-06 PROCEDURE — 3074F SYST BP LT 130 MM HG: CPT | Performed by: STUDENT IN AN ORGANIZED HEALTH CARE EDUCATION/TRAINING PROGRAM

## 2025-08-06 PROCEDURE — 1159F MED LIST DOCD IN RCRD: CPT | Performed by: STUDENT IN AN ORGANIZED HEALTH CARE EDUCATION/TRAINING PROGRAM

## 2025-08-06 PROCEDURE — 3078F DIAST BP <80 MM HG: CPT | Performed by: STUDENT IN AN ORGANIZED HEALTH CARE EDUCATION/TRAINING PROGRAM

## 2025-08-06 PROCEDURE — 1160F RVW MEDS BY RX/DR IN RCRD: CPT | Performed by: STUDENT IN AN ORGANIZED HEALTH CARE EDUCATION/TRAINING PROGRAM

## 2025-08-06 PROCEDURE — 99214 OFFICE O/P EST MOD 30 MIN: CPT | Performed by: STUDENT IN AN ORGANIZED HEALTH CARE EDUCATION/TRAINING PROGRAM

## 2025-08-06 PROCEDURE — G2211 COMPLEX E/M VISIT ADD ON: HCPCS | Performed by: STUDENT IN AN ORGANIZED HEALTH CARE EDUCATION/TRAINING PROGRAM

## 2025-08-06 PROCEDURE — 1126F AMNT PAIN NOTED NONE PRSNT: CPT | Performed by: STUDENT IN AN ORGANIZED HEALTH CARE EDUCATION/TRAINING PROGRAM

## 2025-08-06 RX ORDER — ATORVASTATIN CALCIUM 20 MG/1
20 TABLET, FILM COATED ORAL DAILY
Qty: 90 TABLET | Refills: 3 | Status: SHIPPED | OUTPATIENT
Start: 2025-08-06 | End: 2026-08-06

## 2025-08-06 RX ORDER — LORAZEPAM 1 MG/1
1 TABLET ORAL 2 TIMES DAILY PRN
Qty: 30 TABLET | Refills: 0 | Status: SHIPPED | OUTPATIENT
Start: 2025-08-06

## 2025-08-06 RX ORDER — LISINOPRIL 20 MG/1
20 TABLET ORAL DAILY
Qty: 90 TABLET | Refills: 3 | Status: SHIPPED | OUTPATIENT
Start: 2025-08-06 | End: 2026-08-06

## 2025-08-06 ASSESSMENT — PAIN SCALES - GENERAL: PAINLEVEL_OUTOF10: 0-NO PAIN

## 2025-08-06 NOTE — PATIENT INSTRUCTIONS
- Overall, the patient feels well and denies any acute symptoms or concerns at this time.  - Blood pressure at goal today.  - Encouraged continued dietary, exercise, and lifestyle modification.  - Significant medication and problem list reconciliation done today.   - Today's appointment is to keep the patient in compliance with their controlled substance agreement (CSA).   - The patient has already signed their CSA at an earlier appointment.  - The patient has completed their urine drug screen (UDS) this year.  - I have considered the risks of abuse, dependence, addiction, and/or diversion and have discussed these with the patient during our appointment.  - I do believe that the medication(s) are medically necessary. Patient has tried multiple alternatives to controlled substances in the past with limited success.  - The patient's symptoms are well-controlled on current therapy.  - PDMP reviewed and appropriate.    - Labwork:   - Patient did not require labwork for this appointment.   - Will order labwork for the patient's next appointment. Encouraged the patient to get this labwork done one week prior to the next appointment.

## 2025-08-06 NOTE — PROGRESS NOTES
[facility]: [facility]  PROGRESS NOTE    Claudine Jones is a 89 y.o. female that is presenting today for Follow-up.    Assessment/Plan   - Overall, the patient feels well and denies any acute symptoms or concerns at this time.  - Blood pressure at goal today.  - Encouraged continued dietary, exercise, and lifestyle modification.  - Significant medication and problem list reconciliation done today.   - Today's appointment is to keep the patient in compliance with their controlled substance agreement (CSA).   - The patient has already signed their CSA at an earlier appointment.  - The patient has completed their urine drug screen (UDS) this year.  - I have considered the risks of abuse, dependence, addiction, and/or diversion and have discussed these with the patient during our appointment.  - I do believe that the medication(s) are medically necessary. Patient has tried multiple alternatives to controlled substances in the past with limited success.  - The patient's symptoms are well-controlled on current therapy.  - PDMP reviewed and appropriate.    - Labwork:   - Patient did not require labwork for this appointment.   - Will order labwork for the patient's next appointment. Encouraged the patient to get this labwork done one week prior to the next appointment.    Diagnoses and all orders for this visit:  Polypharmacy  -     Follow Up In Primary Care  Primary hypertension  -     Follow Up In Primary Care  Anxiety  -     Follow Up In Primary Care    Current Outpatient Medications   Medication Instructions    acetaminophen (TYLENOL 8 HOUR) 650 mg, Every 8 hours PRN    aspirin 81 mg, oral, Daily    atorvastatin (LIPITOR) 20 mg, oral, Daily    famotidine (PEPCID) 20 mg, oral, Nightly PRN    ferrous sulfate 325 mg, Daily    lisinopril 20 mg, oral, Daily    loperamide-simethicone 2-125 mg tablet per tablet 1 capsule, oral, 2 times daily (morning and midday)    loratadine (CLARITIN) 10 mg,  "oral, Daily PRN    LORazepam (ATIVAN) 1 mg, oral, 2 times daily PRN    melatonin 10 mg, Daily RT    multivitamin tablet 1 tablet, oral, Daily    omega-3 (Fish Oil) 60- mg capsule 1,000 mg, oral, Daily    omeprazole (PRILOSEC) 20 mg, oral, Daily, Do not crush or chew.     Subjective   HPI  Review of Systems   Objective   Vitals:    08/06/25 1014   BP: 122/60   Pulse: (!) 42   SpO2: 95%      Body mass index is 23.95 kg/m².  Physical Exam  Diagnostic Results   Lab Results   Component Value Date    GLUCOSE 126 (H) 04/28/2025    CALCIUM 9.6 04/28/2025     04/28/2025    K 4.8 04/28/2025    CO2 28 04/28/2025     04/28/2025    BUN 21 04/28/2025    CREATININE 0.84 04/28/2025     Lab Results   Component Value Date    ALT 13 04/28/2025    AST 18 04/28/2025    ALKPHOS 62 04/28/2025    BILITOT 0.5 04/28/2025     Lab Results   Component Value Date    WBC 8.8 04/28/2025    HGB 12.5 04/28/2025    HCT 38.1 04/28/2025    MCV 96.7 04/28/2025     04/28/2025     Lab Results   Component Value Date    CHOL 126 10/22/2024    CHOL 115 (L) 04/04/2023    CHOL 145 04/28/2022     Lab Results   Component Value Date    HDL 54.2 10/22/2024    HDL 59 04/04/2023    HDL 56 04/28/2022     Lab Results   Component Value Date    LDLCALC 53 10/22/2024    LDLCALC 41 (L) 04/04/2023    LDLCALC 68 04/28/2022     Lab Results   Component Value Date    TRIG 92 10/22/2024    TRIG 73 04/04/2023    TRIG 107 04/28/2022     No components found for: \"CHOLHDL\"  Lab Results   Component Value Date    HGBA1C 5.7 (H) 10/22/2024     Other labs not included in the list above were reviewed either before or during this encounter.    History    Medical History[1]  Surgical History[2]  Family History[3]  Social History     Socioeconomic History    Marital status:      Spouse name: Not on file    Number of children: Not on file    Years of education: Not on file    Highest education level: Not on file   Occupational History    Not on file "   Tobacco Use    Smoking status: Never     Passive exposure: Never    Smokeless tobacco: Never   Vaping Use    Vaping status: Never Used   Substance and Sexual Activity    Alcohol use: Yes     Alcohol/week: 7.0 standard drinks of alcohol     Types: 7 Glasses of wine per week    Drug use: Never    Sexual activity: Defer   Other Topics Concern    Not on file   Social History Narrative    ** Merged History Encounter **         ** Merged History Encounter **          Social Drivers of Health     Financial Resource Strain: Not on file   Food Insecurity: Not on file   Transportation Needs: Not on file   Physical Activity: Not on file   Stress: Not on file   Social Connections: Not on file   Intimate Partner Violence: Not on file   Housing Stability: Not on file     Allergies[4]  Medications Ordered Prior to Encounter[5]  Immunization History   Administered Date(s) Administered    Flu vaccine, trivalent, preservative free, HIGH-DOSE, age 65y+ (Fluzone) 10/23/2024    Moderna COVID-19 vaccine, bivalent, blue cap/gray label *Check age/dose* 11/22/2022    Moderna SARS-CoV-2 Vaccination 02/05/2021, 03/05/2021, 10/22/2021, 04/06/2022    Pneumococcal conjugate vaccine, 13-valent (PREVNAR 13) 02/28/2017    Pneumococcal polysaccharide vaccine, 23-valent, age 2 years and older (PNEUMOVAX 23) 12/28/2018    Tdap vaccine, age 7 year and older (BOOSTRIX, ADACEL) 01/17/2024     Patient's medical history was reviewed and updated either before or during this encounter.       Cash Palencia MD         [1]   Past Medical History:  Diagnosis Date    Arthritis     GERD (gastroesophageal reflux disease)     Hand fracture 01/17/2024    right thumb fracture    High blood pressure     High cholesterol    [2]   Past Surgical History:  Procedure Laterality Date    MR NECK ANGIO WO IV CONTRAST  10/17/2019    MR NECK ANGIO WO IV CONTRAST [facility]    TOTAL KNEE ARTHROPLASTY Bilateral    [3] No family history on file.  [4] No Known  Allergies  [5]   Current Outpatient Medications on File Prior to Visit   Medication Sig Dispense Refill    acetaminophen (Tylenol 8 HOUR) 650 mg ER tablet Take 1 tablet (650 mg) by mouth every 8 hours if needed for mild pain (1 - 3). Do not crush, chew, or split.      aspirin 81 mg EC tablet Take 1 tablet (81 mg) by mouth once daily.      atorvastatin (Lipitor) 20 mg tablet Take 1 tablet (20 mg) by mouth once daily. 90 tablet 3    famotidine (Pepcid) 20 mg tablet Take 1 tablet (20 mg) by mouth as needed at bedtime for heartburn or indigestion.      ferrous sulfate 325 (65 Fe) MG EC tablet Take 1 tablet by mouth once daily. Do not crush, chew, or split.      lisinopril 20 mg tablet Take 1 tablet (20 mg) by mouth once daily. 90 tablet 3    loperamide-simethicone 2-125 mg tablet per tablet Take 1 capsule by mouth 2 times daily (morning and midday).      loratadine (Claritin) 10 mg tablet Take 1 tablet (10 mg) by mouth once daily as needed for allergies.      LORazepam (Ativan) 1 mg tablet Take 1 tablet (1 mg) by mouth 2 times a day as needed for anxiety. 30 tablet 0    melatonin 10 mg tablet,disintegrating Take 10 mg by mouth once daily.      multivitamin tablet Take 1 tablet by mouth once daily.      omega-3 (Fish Oil) 60- mg capsule Take 2 capsules (1,000 mg) by mouth once daily.      omeprazole (PriLOSEC) 20 mg DR capsule Take 1 capsule (20 mg) by mouth once daily. Do not crush or chew. 30 capsule 0     No current facility-administered medications on file prior to visit.